# Patient Record
Sex: FEMALE | Race: BLACK OR AFRICAN AMERICAN | NOT HISPANIC OR LATINO | URBAN - METROPOLITAN AREA
[De-identification: names, ages, dates, MRNs, and addresses within clinical notes are randomized per-mention and may not be internally consistent; named-entity substitution may affect disease eponyms.]

---

## 2017-12-04 PROBLEM — Z00.00 ENCOUNTER FOR PREVENTIVE HEALTH EXAMINATION: Status: ACTIVE | Noted: 2017-12-04

## 2022-01-01 ENCOUNTER — INPATIENT (INPATIENT)
Facility: HOSPITAL | Age: 77
LOS: 5 days | DRG: 64 | End: 2022-10-08
Attending: NEUROLOGICAL SURGERY | Admitting: NEUROLOGICAL SURGERY
Payer: MEDICARE

## 2022-01-01 VITALS
RESPIRATION RATE: 16 BRPM | SYSTOLIC BLOOD PRESSURE: 240 MMHG | DIASTOLIC BLOOD PRESSURE: 122 MMHG | TEMPERATURE: 98 F | HEIGHT: 64 IN | HEART RATE: 63 BPM | OXYGEN SATURATION: 96 %

## 2022-01-01 DIAGNOSIS — I61.9 NONTRAUMATIC INTRACEREBRAL HEMORRHAGE, UNSPECIFIED: ICD-10-CM

## 2022-01-01 LAB
A1C WITH ESTIMATED AVERAGE GLUCOSE RESULT: 5.5 % — SIGNIFICANT CHANGE UP (ref 4–5.6)
ABO RH CONFIRMATION: SIGNIFICANT CHANGE UP
ALBUMIN SERPL ELPH-MCNC: 2.2 G/DL — LOW (ref 3.3–5.2)
ALBUMIN SERPL ELPH-MCNC: 3.4 G/DL — SIGNIFICANT CHANGE UP (ref 3.3–5.2)
ALBUMIN SERPL ELPH-MCNC: 3.6 G/DL — SIGNIFICANT CHANGE UP (ref 3.3–5.2)
ALP SERPL-CCNC: 65 U/L — SIGNIFICANT CHANGE UP (ref 40–120)
ALP SERPL-CCNC: 90 U/L — SIGNIFICANT CHANGE UP (ref 40–120)
ALP SERPL-CCNC: 95 U/L — SIGNIFICANT CHANGE UP (ref 40–120)
ALT FLD-CCNC: 72 U/L — HIGH
ALT FLD-CCNC: 9 U/L — SIGNIFICANT CHANGE UP
ALT FLD-CCNC: 9 U/L — SIGNIFICANT CHANGE UP
ANION GAP SERPL CALC-SCNC: 10 MMOL/L — SIGNIFICANT CHANGE UP (ref 5–17)
ANION GAP SERPL CALC-SCNC: 10 MMOL/L — SIGNIFICANT CHANGE UP (ref 5–17)
ANION GAP SERPL CALC-SCNC: 12 MMOL/L — SIGNIFICANT CHANGE UP (ref 5–17)
ANION GAP SERPL CALC-SCNC: 13 MMOL/L — SIGNIFICANT CHANGE UP (ref 5–17)
ANION GAP SERPL CALC-SCNC: 15 MMOL/L — SIGNIFICANT CHANGE UP (ref 5–17)
ANION GAP SERPL CALC-SCNC: 18 MMOL/L — HIGH (ref 5–17)
ANISOCYTOSIS BLD QL: SLIGHT — SIGNIFICANT CHANGE UP
APPEARANCE UR: CLEAR — SIGNIFICANT CHANGE UP
APTT BLD: 30.9 SEC — SIGNIFICANT CHANGE UP (ref 27.5–35.5)
AST SERPL-CCNC: 18 U/L — SIGNIFICANT CHANGE UP
AST SERPL-CCNC: 19 U/L — SIGNIFICANT CHANGE UP
AST SERPL-CCNC: 73 U/L — HIGH
BACTERIA # UR AUTO: ABNORMAL
BASE EXCESS BLDA CALC-SCNC: -0.7 MMOL/L — SIGNIFICANT CHANGE UP (ref -2–3)
BASE EXCESS BLDA CALC-SCNC: -2.3 MMOL/L — LOW (ref -2–3)
BASOPHILS # BLD AUTO: 0.06 K/UL — SIGNIFICANT CHANGE UP (ref 0–0.2)
BASOPHILS # BLD AUTO: 0.2 K/UL — SIGNIFICANT CHANGE UP (ref 0–0.2)
BASOPHILS NFR BLD AUTO: 0.5 % — SIGNIFICANT CHANGE UP (ref 0–2)
BASOPHILS NFR BLD AUTO: 0.9 % — SIGNIFICANT CHANGE UP (ref 0–2)
BILIRUB DIRECT SERPL-MCNC: 0.1 MG/DL — SIGNIFICANT CHANGE UP (ref 0–0.3)
BILIRUB INDIRECT FLD-MCNC: 0.2 MG/DL — SIGNIFICANT CHANGE UP (ref 0.2–1)
BILIRUB SERPL-MCNC: 0.3 MG/DL — LOW (ref 0.4–2)
BILIRUB SERPL-MCNC: 0.3 MG/DL — LOW (ref 0.4–2)
BILIRUB SERPL-MCNC: 0.8 MG/DL — SIGNIFICANT CHANGE UP (ref 0.4–2)
BILIRUB UR-MCNC: NEGATIVE — SIGNIFICANT CHANGE UP
BLOOD GAS COMMENTS ARTERIAL: SIGNIFICANT CHANGE UP
BLOOD GAS COMMENTS ARTERIAL: SIGNIFICANT CHANGE UP
BUN SERPL-MCNC: 11.7 MG/DL — SIGNIFICANT CHANGE UP (ref 8–20)
BUN SERPL-MCNC: 18 MG/DL — SIGNIFICANT CHANGE UP (ref 8–20)
BUN SERPL-MCNC: 28.9 MG/DL — HIGH (ref 8–20)
BUN SERPL-MCNC: 28.9 MG/DL — HIGH (ref 8–20)
BUN SERPL-MCNC: 30.3 MG/DL — HIGH (ref 8–20)
BUN SERPL-MCNC: 32.4 MG/DL — HIGH (ref 8–20)
BURR CELLS BLD QL SMEAR: PRESENT — SIGNIFICANT CHANGE UP
CALCIUM SERPL-MCNC: 10 MG/DL — SIGNIFICANT CHANGE UP (ref 8.4–10.5)
CALCIUM SERPL-MCNC: 9.3 MG/DL — SIGNIFICANT CHANGE UP (ref 8.4–10.5)
CALCIUM SERPL-MCNC: 9.4 MG/DL — SIGNIFICANT CHANGE UP (ref 8.4–10.5)
CALCIUM SERPL-MCNC: 9.5 MG/DL — SIGNIFICANT CHANGE UP (ref 8.4–10.5)
CALCIUM SERPL-MCNC: 9.8 MG/DL — SIGNIFICANT CHANGE UP (ref 8.4–10.5)
CALCIUM SERPL-MCNC: 9.9 MG/DL — SIGNIFICANT CHANGE UP (ref 8.4–10.5)
CHLORIDE SERPL-SCNC: 106 MMOL/L — SIGNIFICANT CHANGE UP (ref 98–107)
CHLORIDE SERPL-SCNC: 108 MMOL/L — HIGH (ref 98–107)
CHLORIDE SERPL-SCNC: 115 MMOL/L — HIGH (ref 98–107)
CHLORIDE SERPL-SCNC: 117 MMOL/L — HIGH (ref 98–107)
CHLORIDE SERPL-SCNC: 118 MMOL/L — HIGH (ref 98–107)
CHLORIDE SERPL-SCNC: 119 MMOL/L — HIGH (ref 98–107)
CHOLEST SERPL-MCNC: 135 MG/DL — SIGNIFICANT CHANGE UP
CO2 SERPL-SCNC: 19 MMOL/L — LOW (ref 22–29)
CO2 SERPL-SCNC: 19 MMOL/L — LOW (ref 22–29)
CO2 SERPL-SCNC: 20 MMOL/L — LOW (ref 22–29)
CO2 SERPL-SCNC: 22 MMOL/L — SIGNIFICANT CHANGE UP (ref 22–29)
CO2 SERPL-SCNC: 22 MMOL/L — SIGNIFICANT CHANGE UP (ref 22–29)
CO2 SERPL-SCNC: 24 MMOL/L — SIGNIFICANT CHANGE UP (ref 22–29)
COLOR SPEC: YELLOW — SIGNIFICANT CHANGE UP
CREAT SERPL-MCNC: 0.87 MG/DL — SIGNIFICANT CHANGE UP (ref 0.5–1.3)
CREAT SERPL-MCNC: 0.96 MG/DL — SIGNIFICANT CHANGE UP (ref 0.5–1.3)
CREAT SERPL-MCNC: 1.01 MG/DL — SIGNIFICANT CHANGE UP (ref 0.5–1.3)
CREAT SERPL-MCNC: 1.01 MG/DL — SIGNIFICANT CHANGE UP (ref 0.5–1.3)
CREAT SERPL-MCNC: 1.27 MG/DL — SIGNIFICANT CHANGE UP (ref 0.5–1.3)
CREAT SERPL-MCNC: 1.35 MG/DL — HIGH (ref 0.5–1.3)
CULTURE RESULTS: NO GROWTH — SIGNIFICANT CHANGE UP
CULTURE RESULTS: SIGNIFICANT CHANGE UP
DIFF PNL FLD: ABNORMAL
EGFR: 41 ML/MIN/1.73M2 — LOW
EGFR: 44 ML/MIN/1.73M2 — LOW
EGFR: 58 ML/MIN/1.73M2 — LOW
EGFR: 58 ML/MIN/1.73M2 — LOW
EGFR: 62 ML/MIN/1.73M2 — SIGNIFICANT CHANGE UP
EGFR: 69 ML/MIN/1.73M2 — SIGNIFICANT CHANGE UP
EOSINOPHIL # BLD AUTO: 0 K/UL — SIGNIFICANT CHANGE UP (ref 0–0.5)
EOSINOPHIL # BLD AUTO: 0.28 K/UL — SIGNIFICANT CHANGE UP (ref 0–0.5)
EOSINOPHIL NFR BLD AUTO: 0 % — SIGNIFICANT CHANGE UP (ref 0–6)
EOSINOPHIL NFR BLD AUTO: 2.1 % — SIGNIFICANT CHANGE UP (ref 0–6)
EPI CELLS # UR: SIGNIFICANT CHANGE UP
ESTIMATED AVERAGE GLUCOSE: 111 MG/DL — SIGNIFICANT CHANGE UP (ref 68–114)
FLUAV AG NPH QL: SIGNIFICANT CHANGE UP
FLUBV AG NPH QL: SIGNIFICANT CHANGE UP
GAS PNL BLDA: SIGNIFICANT CHANGE UP
GIANT PLATELETS BLD QL SMEAR: PRESENT — SIGNIFICANT CHANGE UP
GLUCOSE BLDC GLUCOMTR-MCNC: 157 MG/DL — HIGH (ref 70–99)
GLUCOSE SERPL-MCNC: 144 MG/DL — HIGH (ref 70–99)
GLUCOSE SERPL-MCNC: 144 MG/DL — HIGH (ref 70–99)
GLUCOSE SERPL-MCNC: 156 MG/DL — HIGH (ref 70–99)
GLUCOSE SERPL-MCNC: 161 MG/DL — HIGH (ref 70–99)
GLUCOSE SERPL-MCNC: 179 MG/DL — HIGH (ref 70–99)
GLUCOSE SERPL-MCNC: 194 MG/DL — HIGH (ref 70–99)
GLUCOSE UR QL: NEGATIVE MG/DL — SIGNIFICANT CHANGE UP
GRAM STN FLD: SIGNIFICANT CHANGE UP
HCO3 BLDA-SCNC: 21 MMOL/L — SIGNIFICANT CHANGE UP (ref 21–28)
HCO3 BLDA-SCNC: 22 MMOL/L — SIGNIFICANT CHANGE UP (ref 21–28)
HCT VFR BLD CALC: 30.7 % — LOW (ref 34.5–45)
HCT VFR BLD CALC: 32.9 % — LOW (ref 34.5–45)
HCT VFR BLD CALC: 33.1 % — LOW (ref 34.5–45)
HCT VFR BLD CALC: 35.9 % — SIGNIFICANT CHANGE UP (ref 34.5–45)
HCT VFR BLD CALC: 38.3 % — SIGNIFICANT CHANGE UP (ref 34.5–45)
HCT VFR BLD CALC: 41.1 % — SIGNIFICANT CHANGE UP (ref 34.5–45)
HCV AB S/CO SERPL IA: 0.07 S/CO — SIGNIFICANT CHANGE UP (ref 0–0.99)
HCV AB SERPL-IMP: SIGNIFICANT CHANGE UP
HDLC SERPL-MCNC: 51 MG/DL — SIGNIFICANT CHANGE UP
HGB BLD-MCNC: 10.4 G/DL — LOW (ref 11.5–15.5)
HGB BLD-MCNC: 10.5 G/DL — LOW (ref 11.5–15.5)
HGB BLD-MCNC: 11.6 G/DL — SIGNIFICANT CHANGE UP (ref 11.5–15.5)
HGB BLD-MCNC: 12.5 G/DL — SIGNIFICANT CHANGE UP (ref 11.5–15.5)
HGB BLD-MCNC: 13.8 G/DL — SIGNIFICANT CHANGE UP (ref 11.5–15.5)
HGB BLD-MCNC: 9.7 G/DL — LOW (ref 11.5–15.5)
HOROWITZ INDEX BLDA+IHG-RTO: 30 — SIGNIFICANT CHANGE UP
HOROWITZ INDEX BLDA+IHG-RTO: SIGNIFICANT CHANGE UP
IMM GRANULOCYTES NFR BLD AUTO: 0.7 % — SIGNIFICANT CHANGE UP (ref 0–0.9)
INR BLD: 1.09 RATIO — SIGNIFICANT CHANGE UP (ref 0.88–1.16)
KETONES UR-MCNC: NEGATIVE — SIGNIFICANT CHANGE UP
LEUKOCYTE ESTERASE UR-ACNC: NEGATIVE — SIGNIFICANT CHANGE UP
LIPID PNL WITH DIRECT LDL SERPL: 70 MG/DL — SIGNIFICANT CHANGE UP
LYMPHOCYTES # BLD AUTO: 1.19 K/UL — SIGNIFICANT CHANGE UP (ref 1–3.3)
LYMPHOCYTES # BLD AUTO: 19.6 % — SIGNIFICANT CHANGE UP (ref 13–44)
LYMPHOCYTES # BLD AUTO: 2.58 K/UL — SIGNIFICANT CHANGE UP (ref 1–3.3)
LYMPHOCYTES # BLD AUTO: 5.3 % — LOW (ref 13–44)
MAGNESIUM SERPL-MCNC: 2 MG/DL — SIGNIFICANT CHANGE UP (ref 1.6–2.6)
MAGNESIUM SERPL-MCNC: 2.1 MG/DL — SIGNIFICANT CHANGE UP (ref 1.6–2.6)
MAGNESIUM SERPL-MCNC: 2.5 MG/DL — SIGNIFICANT CHANGE UP (ref 1.6–2.6)
MAGNESIUM SERPL-MCNC: 2.7 MG/DL — HIGH (ref 1.6–2.6)
MAGNESIUM SERPL-MCNC: 2.7 MG/DL — HIGH (ref 1.6–2.6)
MANUAL SMEAR VERIFICATION: SIGNIFICANT CHANGE UP
MCHC RBC-ENTMCNC: 29.5 PG — SIGNIFICANT CHANGE UP (ref 27–34)
MCHC RBC-ENTMCNC: 29.7 PG — SIGNIFICANT CHANGE UP (ref 27–34)
MCHC RBC-ENTMCNC: 29.7 PG — SIGNIFICANT CHANGE UP (ref 27–34)
MCHC RBC-ENTMCNC: 30 PG — SIGNIFICANT CHANGE UP (ref 27–34)
MCHC RBC-ENTMCNC: 30.2 PG — SIGNIFICANT CHANGE UP (ref 27–34)
MCHC RBC-ENTMCNC: 30.3 PG — SIGNIFICANT CHANGE UP (ref 27–34)
MCHC RBC-ENTMCNC: 31.6 GM/DL — LOW (ref 32–36)
MCHC RBC-ENTMCNC: 31.6 GM/DL — LOW (ref 32–36)
MCHC RBC-ENTMCNC: 31.7 GM/DL — LOW (ref 32–36)
MCHC RBC-ENTMCNC: 32.3 GM/DL — SIGNIFICANT CHANGE UP (ref 32–36)
MCHC RBC-ENTMCNC: 32.6 GM/DL — SIGNIFICANT CHANGE UP (ref 32–36)
MCHC RBC-ENTMCNC: 33.6 GM/DL — SIGNIFICANT CHANGE UP (ref 32–36)
MCV RBC AUTO: 90.1 FL — SIGNIFICANT CHANGE UP (ref 80–100)
MCV RBC AUTO: 91.8 FL — SIGNIFICANT CHANGE UP (ref 80–100)
MCV RBC AUTO: 92.1 FL — SIGNIFICANT CHANGE UP (ref 80–100)
MCV RBC AUTO: 93.2 FL — SIGNIFICANT CHANGE UP (ref 80–100)
MCV RBC AUTO: 93.9 FL — SIGNIFICANT CHANGE UP (ref 80–100)
MCV RBC AUTO: 95.1 FL — SIGNIFICANT CHANGE UP (ref 80–100)
MONOCYTES # BLD AUTO: 0.6 K/UL — SIGNIFICANT CHANGE UP (ref 0–0.9)
MONOCYTES # BLD AUTO: 0.77 K/UL — SIGNIFICANT CHANGE UP (ref 0–0.9)
MONOCYTES NFR BLD AUTO: 2.7 % — SIGNIFICANT CHANGE UP (ref 2–14)
MONOCYTES NFR BLD AUTO: 5.9 % — SIGNIFICANT CHANGE UP (ref 2–14)
MRSA PCR RESULT.: SIGNIFICANT CHANGE UP
NEUTROPHILS # BLD AUTO: 20.4 K/UL — HIGH (ref 1.8–7.4)
NEUTROPHILS # BLD AUTO: 9.38 K/UL — HIGH (ref 1.8–7.4)
NEUTROPHILS NFR BLD AUTO: 70.8 % — SIGNIFICANT CHANGE UP (ref 43–77)
NEUTROPHILS NFR BLD AUTO: 71.2 % — SIGNIFICANT CHANGE UP (ref 43–77)
NEUTS BAND # BLD: 20.3 % — HIGH (ref 0–8)
NITRITE UR-MCNC: NEGATIVE — SIGNIFICANT CHANGE UP
NON HDL CHOLESTEROL: 84 MG/DL — SIGNIFICANT CHANGE UP
OSMOLALITY UR: 389 MOSM/KG — SIGNIFICANT CHANGE UP (ref 300–1000)
OSMOLALITY UR: 421 MOSM/KG — SIGNIFICANT CHANGE UP (ref 300–1000)
OVALOCYTES BLD QL SMEAR: SLIGHT — SIGNIFICANT CHANGE UP
PCO2 BLDA: 24 MMHG — LOW (ref 32–45)
PCO2 BLDA: 26 MMHG — LOW (ref 32–45)
PH BLDA: 7.51 — HIGH (ref 7.35–7.45)
PH BLDA: 7.56 — HIGH (ref 7.35–7.45)
PH UR: 7 — SIGNIFICANT CHANGE UP (ref 5–8)
PHOSPHATE SERPL-MCNC: 2.3 MG/DL — LOW (ref 2.4–4.7)
PHOSPHATE SERPL-MCNC: 2.3 MG/DL — LOW (ref 2.4–4.7)
PHOSPHATE SERPL-MCNC: 2.5 MG/DL — SIGNIFICANT CHANGE UP (ref 2.4–4.7)
PHOSPHATE SERPL-MCNC: 2.9 MG/DL — SIGNIFICANT CHANGE UP (ref 2.4–4.7)
PHOSPHATE SERPL-MCNC: 3 MG/DL — SIGNIFICANT CHANGE UP (ref 2.4–4.7)
PLAT MORPH BLD: NORMAL — SIGNIFICANT CHANGE UP
PLATELET # BLD AUTO: 215 K/UL — SIGNIFICANT CHANGE UP (ref 150–400)
PLATELET # BLD AUTO: 231 K/UL — SIGNIFICANT CHANGE UP (ref 150–400)
PLATELET # BLD AUTO: 240 K/UL — SIGNIFICANT CHANGE UP (ref 150–400)
PLATELET # BLD AUTO: 289 K/UL — SIGNIFICANT CHANGE UP (ref 150–400)
PLATELET # BLD AUTO: 342 K/UL — SIGNIFICANT CHANGE UP (ref 150–400)
PLATELET # BLD AUTO: 365 K/UL — SIGNIFICANT CHANGE UP (ref 150–400)
PO2 BLDA: 109 MMHG — HIGH (ref 83–108)
PO2 BLDA: 86 MMHG — SIGNIFICANT CHANGE UP (ref 83–108)
POIKILOCYTOSIS BLD QL AUTO: SLIGHT — SIGNIFICANT CHANGE UP
POLYCHROMASIA BLD QL SMEAR: SLIGHT — SIGNIFICANT CHANGE UP
POTASSIUM SERPL-MCNC: 3.3 MMOL/L — LOW (ref 3.5–5.3)
POTASSIUM SERPL-MCNC: 3.5 MMOL/L — SIGNIFICANT CHANGE UP (ref 3.5–5.3)
POTASSIUM SERPL-MCNC: 3.6 MMOL/L — SIGNIFICANT CHANGE UP (ref 3.5–5.3)
POTASSIUM SERPL-MCNC: 4.1 MMOL/L — SIGNIFICANT CHANGE UP (ref 3.5–5.3)
POTASSIUM SERPL-MCNC: 4.5 MMOL/L — SIGNIFICANT CHANGE UP (ref 3.5–5.3)
POTASSIUM SERPL-MCNC: 4.6 MMOL/L — SIGNIFICANT CHANGE UP (ref 3.5–5.3)
POTASSIUM SERPL-SCNC: 3.3 MMOL/L — LOW (ref 3.5–5.3)
POTASSIUM SERPL-SCNC: 3.5 MMOL/L — SIGNIFICANT CHANGE UP (ref 3.5–5.3)
POTASSIUM SERPL-SCNC: 3.6 MMOL/L — SIGNIFICANT CHANGE UP (ref 3.5–5.3)
POTASSIUM SERPL-SCNC: 4.1 MMOL/L — SIGNIFICANT CHANGE UP (ref 3.5–5.3)
POTASSIUM SERPL-SCNC: 4.5 MMOL/L — SIGNIFICANT CHANGE UP (ref 3.5–5.3)
POTASSIUM SERPL-SCNC: 4.6 MMOL/L — SIGNIFICANT CHANGE UP (ref 3.5–5.3)
PROT SERPL-MCNC: 5.9 G/DL — LOW (ref 6.6–8.7)
PROT SERPL-MCNC: 7.1 G/DL — SIGNIFICANT CHANGE UP (ref 6.6–8.7)
PROT SERPL-MCNC: 7.4 G/DL — SIGNIFICANT CHANGE UP (ref 6.6–8.7)
PROT UR-MCNC: NEGATIVE — SIGNIFICANT CHANGE UP
PROTHROM AB SERPL-ACNC: 12.6 SEC — SIGNIFICANT CHANGE UP (ref 10.5–13.4)
RBC # BLD: 3.27 M/UL — LOW (ref 3.8–5.2)
RBC # BLD: 3.48 M/UL — LOW (ref 3.8–5.2)
RBC # BLD: 3.53 M/UL — LOW (ref 3.8–5.2)
RBC # BLD: 3.9 M/UL — SIGNIFICANT CHANGE UP (ref 3.8–5.2)
RBC # BLD: 4.17 M/UL — SIGNIFICANT CHANGE UP (ref 3.8–5.2)
RBC # BLD: 4.56 M/UL — SIGNIFICANT CHANGE UP (ref 3.8–5.2)
RBC # FLD: 13.4 % — SIGNIFICANT CHANGE UP (ref 10.3–14.5)
RBC # FLD: 13.7 % — SIGNIFICANT CHANGE UP (ref 10.3–14.5)
RBC # FLD: 14.4 % — SIGNIFICANT CHANGE UP (ref 10.3–14.5)
RBC # FLD: 14.6 % — HIGH (ref 10.3–14.5)
RBC # FLD: 14.7 % — HIGH (ref 10.3–14.5)
RBC # FLD: 14.8 % — HIGH (ref 10.3–14.5)
RBC BLD AUTO: SIGNIFICANT CHANGE UP
RBC CASTS # UR COMP ASSIST: SIGNIFICANT CHANGE UP /HPF (ref 0–4)
RSV RNA NPH QL NAA+NON-PROBE: SIGNIFICANT CHANGE UP
S AUREUS DNA NOSE QL NAA+PROBE: SIGNIFICANT CHANGE UP
SAO2 % BLDA: 100 % — HIGH (ref 94–98)
SAO2 % BLDA: 99.5 % — HIGH (ref 94–98)
SARS-COV-2 RNA SPEC QL NAA+PROBE: DETECTED
SCHISTOCYTES BLD QL AUTO: SLIGHT — SIGNIFICANT CHANGE UP
SODIUM SERPL-SCNC: 142 MMOL/L — SIGNIFICANT CHANGE UP (ref 135–145)
SODIUM SERPL-SCNC: 145 MMOL/L — SIGNIFICANT CHANGE UP (ref 135–145)
SODIUM SERPL-SCNC: 149 MMOL/L — HIGH (ref 135–145)
SODIUM SERPL-SCNC: 149 MMOL/L — HIGH (ref 135–145)
SODIUM SERPL-SCNC: 150 MMOL/L — HIGH (ref 135–145)
SODIUM SERPL-SCNC: 151 MMOL/L — HIGH (ref 135–145)
SODIUM UR-SCNC: 157 MMOL/L — SIGNIFICANT CHANGE UP
SP GR SPEC: 1 — LOW (ref 1.01–1.02)
SPECIMEN SOURCE: SIGNIFICANT CHANGE UP
T3 SERPL-MCNC: 76 NG/DL — LOW (ref 80–200)
T4 AB SER-ACNC: 8.4 UG/DL — SIGNIFICANT CHANGE UP (ref 4.5–12)
TRIGL SERPL-MCNC: 68 MG/DL — SIGNIFICANT CHANGE UP
TROPONIN T SERPL-MCNC: <0.01 NG/ML — SIGNIFICANT CHANGE UP (ref 0–0.06)
TSH SERPL-MCNC: 0.18 UIU/ML — LOW (ref 0.27–4.2)
TSH SERPL-MCNC: 0.2 UIU/ML — LOW (ref 0.27–4.2)
UFH PPP CHRO-ACNC: 0.03 U/ML — LOW (ref 0.3–0.7)
UROBILINOGEN FLD QL: NEGATIVE MG/DL — SIGNIFICANT CHANGE UP
WBC # BLD: 11.8 K/UL — HIGH (ref 3.8–10.5)
WBC # BLD: 13.16 K/UL — HIGH (ref 3.8–10.5)
WBC # BLD: 15.55 K/UL — HIGH (ref 3.8–10.5)
WBC # BLD: 18.8 K/UL — HIGH (ref 3.8–10.5)
WBC # BLD: 22.25 K/UL — HIGH (ref 3.8–10.5)
WBC # BLD: 22.39 K/UL — HIGH (ref 3.8–10.5)
WBC # FLD AUTO: 11.8 K/UL — HIGH (ref 3.8–10.5)
WBC # FLD AUTO: 13.16 K/UL — HIGH (ref 3.8–10.5)
WBC # FLD AUTO: 15.55 K/UL — HIGH (ref 3.8–10.5)
WBC # FLD AUTO: 18.8 K/UL — HIGH (ref 3.8–10.5)
WBC # FLD AUTO: 22.25 K/UL — HIGH (ref 3.8–10.5)
WBC # FLD AUTO: 22.39 K/UL — HIGH (ref 3.8–10.5)
WBC UR QL: SIGNIFICANT CHANGE UP /HPF (ref 0–5)

## 2022-01-01 PROCEDURE — 31500 INSERT EMERGENCY AIRWAY: CPT

## 2022-01-01 PROCEDURE — 99233 SBSQ HOSP IP/OBS HIGH 50: CPT

## 2022-01-01 PROCEDURE — 99291 CRITICAL CARE FIRST HOUR: CPT

## 2022-01-01 PROCEDURE — 99232 SBSQ HOSP IP/OBS MODERATE 35: CPT

## 2022-01-01 PROCEDURE — 95819 EEG AWAKE AND ASLEEP: CPT | Mod: 26

## 2022-01-01 PROCEDURE — 99222 1ST HOSP IP/OBS MODERATE 55: CPT

## 2022-01-01 PROCEDURE — 93970 EXTREMITY STUDY: CPT | Mod: 26

## 2022-01-01 PROCEDURE — 70450 CT HEAD/BRAIN W/O DYE: CPT | Mod: 26,MA

## 2022-01-01 PROCEDURE — 71045 X-RAY EXAM CHEST 1 VIEW: CPT | Mod: 26

## 2022-01-01 PROCEDURE — 99498 ADVNCD CARE PLAN ADDL 30 MIN: CPT | Mod: 25

## 2022-01-01 PROCEDURE — 86077 PHYS BLOOD BANK SERV XMATCH: CPT

## 2022-01-01 PROCEDURE — 70450 CT HEAD/BRAIN W/O DYE: CPT | Mod: 26

## 2022-01-01 PROCEDURE — 99497 ADVNCD CARE PLAN 30 MIN: CPT | Mod: 25

## 2022-01-01 PROCEDURE — 36569 INSJ PICC 5 YR+ W/O IMAGING: CPT

## 2022-01-01 PROCEDURE — 93010 ELECTROCARDIOGRAM REPORT: CPT

## 2022-01-01 PROCEDURE — 93306 TTE W/DOPPLER COMPLETE: CPT | Mod: 26

## 2022-01-01 PROCEDURE — 99053 MED SERV 10PM-8AM 24 HR FAC: CPT

## 2022-01-01 PROCEDURE — 99291 CRITICAL CARE FIRST HOUR: CPT | Mod: 25

## 2022-01-01 RX ORDER — SODIUM CHLORIDE 9 MG/ML
500 INJECTION INTRAMUSCULAR; INTRAVENOUS; SUBCUTANEOUS ONCE
Refills: 0 | Status: COMPLETED | OUTPATIENT
Start: 2022-01-01 | End: 2022-01-01

## 2022-01-01 RX ORDER — DEXMEDETOMIDINE HYDROCHLORIDE IN 0.9% SODIUM CHLORIDE 4 UG/ML
0.2 INJECTION INTRAVENOUS
Qty: 200 | Refills: 0 | Status: DISCONTINUED | OUTPATIENT
Start: 2022-01-01 | End: 2022-01-01

## 2022-01-01 RX ORDER — POTASSIUM CHLORIDE 20 MEQ
40 PACKET (EA) ORAL ONCE
Refills: 0 | Status: COMPLETED | OUTPATIENT
Start: 2022-01-01 | End: 2022-01-01

## 2022-01-01 RX ORDER — LABETALOL HCL 100 MG
10 TABLET ORAL
Refills: 0 | Status: DISCONTINUED | OUTPATIENT
Start: 2022-01-01 | End: 2022-01-01

## 2022-01-01 RX ORDER — DILTIAZEM HCL 120 MG
60 CAPSULE, EXT RELEASE 24 HR ORAL EVERY 8 HOURS
Refills: 0 | Status: DISCONTINUED | OUTPATIENT
Start: 2022-01-01 | End: 2022-01-01

## 2022-01-01 RX ORDER — HYDRALAZINE HCL 50 MG
50 TABLET ORAL EVERY 8 HOURS
Refills: 0 | Status: DISCONTINUED | OUTPATIENT
Start: 2022-01-01 | End: 2022-01-01

## 2022-01-01 RX ORDER — SENNA PLUS 8.6 MG/1
1 TABLET ORAL AT BEDTIME
Refills: 0 | Status: DISCONTINUED | OUTPATIENT
Start: 2022-01-01 | End: 2022-01-01

## 2022-01-01 RX ORDER — CEFEPIME 1 G/1
INJECTION, POWDER, FOR SOLUTION INTRAMUSCULAR; INTRAVENOUS
Refills: 0 | Status: DISCONTINUED | OUTPATIENT
Start: 2022-01-01 | End: 2022-01-01

## 2022-01-01 RX ORDER — CARVEDILOL PHOSPHATE 80 MG/1
1 CAPSULE, EXTENDED RELEASE ORAL
Qty: 0 | Refills: 0 | DISCHARGE

## 2022-01-01 RX ORDER — FENTANYL CITRATE 50 UG/ML
50 INJECTION INTRAVENOUS EVERY 6 HOURS
Refills: 0 | Status: DISCONTINUED | OUTPATIENT
Start: 2022-01-01 | End: 2022-01-01

## 2022-01-01 RX ORDER — LISINOPRIL 2.5 MG/1
10 TABLET ORAL DAILY
Refills: 0 | Status: DISCONTINUED | OUTPATIENT
Start: 2022-01-01 | End: 2022-01-01

## 2022-01-01 RX ORDER — FAMOTIDINE 10 MG/ML
20 INJECTION INTRAVENOUS
Refills: 0 | Status: DISCONTINUED | OUTPATIENT
Start: 2022-01-01 | End: 2022-01-01

## 2022-01-01 RX ORDER — CHLORHEXIDINE GLUCONATE 213 G/1000ML
1 SOLUTION TOPICAL DAILY
Refills: 0 | Status: DISCONTINUED | OUTPATIENT
Start: 2022-01-01 | End: 2022-01-01

## 2022-01-01 RX ORDER — FAMOTIDINE 10 MG/ML
20 INJECTION INTRAVENOUS DAILY
Refills: 0 | Status: DISCONTINUED | OUTPATIENT
Start: 2022-01-01 | End: 2022-01-01

## 2022-01-01 RX ORDER — SODIUM CHLORIDE 9 MG/ML
1000 INJECTION INTRAMUSCULAR; INTRAVENOUS; SUBCUTANEOUS
Refills: 0 | Status: DISCONTINUED | OUTPATIENT
Start: 2022-01-01 | End: 2022-01-01

## 2022-01-01 RX ORDER — ETOMIDATE 2 MG/ML
20 INJECTION INTRAVENOUS ONCE
Refills: 0 | Status: COMPLETED | OUTPATIENT
Start: 2022-01-01 | End: 2022-01-01

## 2022-01-01 RX ORDER — ROSUVASTATIN CALCIUM 5 MG/1
1 TABLET ORAL
Qty: 0 | Refills: 0 | DISCHARGE

## 2022-01-01 RX ORDER — CEFEPIME 1 G/1
2000 INJECTION, POWDER, FOR SOLUTION INTRAMUSCULAR; INTRAVENOUS EVERY 12 HOURS
Refills: 0 | Status: DISCONTINUED | OUTPATIENT
Start: 2022-01-01 | End: 2022-01-01

## 2022-01-01 RX ORDER — SODIUM,POTASSIUM PHOSPHATES 278-250MG
1 POWDER IN PACKET (EA) ORAL ONCE
Refills: 0 | Status: COMPLETED | OUTPATIENT
Start: 2022-01-01 | End: 2022-01-01

## 2022-01-01 RX ORDER — HYDRALAZINE HCL 50 MG
10 TABLET ORAL
Refills: 0 | Status: DISCONTINUED | OUTPATIENT
Start: 2022-01-01 | End: 2022-01-01

## 2022-01-01 RX ORDER — LISINOPRIL 2.5 MG/1
20 TABLET ORAL DAILY
Refills: 0 | Status: DISCONTINUED | OUTPATIENT
Start: 2022-01-01 | End: 2022-01-01

## 2022-01-01 RX ORDER — POTASSIUM CHLORIDE 20 MEQ
10 PACKET (EA) ORAL
Refills: 0 | Status: COMPLETED | OUTPATIENT
Start: 2022-01-01 | End: 2022-01-01

## 2022-01-01 RX ORDER — LEVETIRACETAM 250 MG/1
500 TABLET, FILM COATED ORAL EVERY 12 HOURS
Refills: 0 | Status: DISCONTINUED | OUTPATIENT
Start: 2022-01-01 | End: 2022-01-01

## 2022-01-01 RX ORDER — ACETAMINOPHEN 500 MG
1000 TABLET ORAL ONCE
Refills: 0 | Status: COMPLETED | OUTPATIENT
Start: 2022-01-01 | End: 2022-01-01

## 2022-01-01 RX ORDER — CHLORHEXIDINE GLUCONATE 213 G/1000ML
15 SOLUTION TOPICAL EVERY 12 HOURS
Refills: 0 | Status: DISCONTINUED | OUTPATIENT
Start: 2022-01-01 | End: 2022-01-01

## 2022-01-01 RX ORDER — ROCURONIUM BROMIDE 10 MG/ML
100 VIAL (ML) INTRAVENOUS ONCE
Refills: 0 | Status: COMPLETED | OUTPATIENT
Start: 2022-01-01 | End: 2022-01-01

## 2022-01-01 RX ORDER — HYDRALAZINE HCL 50 MG
2.5 TABLET ORAL ONCE
Refills: 0 | Status: COMPLETED | OUTPATIENT
Start: 2022-01-01 | End: 2022-01-01

## 2022-01-01 RX ORDER — LATANOPROST 0.05 MG/ML
1 SOLUTION/ DROPS OPHTHALMIC; TOPICAL AT BEDTIME
Refills: 0 | Status: DISCONTINUED | OUTPATIENT
Start: 2022-01-01 | End: 2022-01-01

## 2022-01-01 RX ORDER — MANNITOL
70 POWDER (GRAM) MISCELLANEOUS ONCE
Refills: 0 | Status: COMPLETED | OUTPATIENT
Start: 2022-01-01 | End: 2022-01-01

## 2022-01-01 RX ORDER — CEFEPIME 1 G/1
2000 INJECTION, POWDER, FOR SOLUTION INTRAMUSCULAR; INTRAVENOUS ONCE
Refills: 0 | Status: COMPLETED | OUTPATIENT
Start: 2022-01-01 | End: 2022-01-01

## 2022-01-01 RX ORDER — POTASSIUM PHOSPHATE, MONOBASIC POTASSIUM PHOSPHATE, DIBASIC 236; 224 MG/ML; MG/ML
15 INJECTION, SOLUTION INTRAVENOUS ONCE
Refills: 0 | Status: COMPLETED | OUTPATIENT
Start: 2022-01-01 | End: 2022-01-01

## 2022-01-01 RX ORDER — MANNITOL
70 POWDER (GRAM) MISCELLANEOUS ONCE
Refills: 0 | Status: DISCONTINUED | OUTPATIENT
Start: 2022-01-01 | End: 2022-01-01

## 2022-01-01 RX ORDER — LEVETIRACETAM 250 MG/1
1000 TABLET, FILM COATED ORAL ONCE
Refills: 0 | Status: COMPLETED | OUTPATIENT
Start: 2022-01-01 | End: 2022-01-01

## 2022-01-01 RX ORDER — POLYETHYLENE GLYCOL 3350 17 G/17G
17 POWDER, FOR SOLUTION ORAL DAILY
Refills: 0 | Status: DISCONTINUED | OUTPATIENT
Start: 2022-01-01 | End: 2022-01-01

## 2022-01-01 RX ORDER — HYDRALAZINE HCL 50 MG
100 TABLET ORAL EVERY 8 HOURS
Refills: 0 | Status: DISCONTINUED | OUTPATIENT
Start: 2022-01-01 | End: 2022-01-01

## 2022-01-01 RX ORDER — FUROSEMIDE 40 MG
1 TABLET ORAL
Qty: 0 | Refills: 0 | DISCHARGE

## 2022-01-01 RX ORDER — LEVETIRACETAM 250 MG/1
500 TABLET, FILM COATED ORAL
Refills: 0 | Status: DISCONTINUED | OUTPATIENT
Start: 2022-01-01 | End: 2022-01-01

## 2022-01-01 RX ORDER — FENTANYL CITRATE 50 UG/ML
25 INJECTION INTRAVENOUS EVERY 6 HOURS
Refills: 0 | Status: DISCONTINUED | OUTPATIENT
Start: 2022-01-01 | End: 2022-01-01

## 2022-01-01 RX ORDER — ACETAMINOPHEN 500 MG
650 TABLET ORAL EVERY 6 HOURS
Refills: 0 | Status: DISCONTINUED | OUTPATIENT
Start: 2022-01-01 | End: 2022-01-01

## 2022-01-01 RX ORDER — ALBUTEROL 90 UG/1
2 AEROSOL, METERED ORAL EVERY 6 HOURS
Refills: 0 | Status: DISCONTINUED | OUTPATIENT
Start: 2022-01-01 | End: 2022-01-01

## 2022-01-01 RX ORDER — NICARDIPINE HYDROCHLORIDE 30 MG/1
5 CAPSULE, EXTENDED RELEASE ORAL
Qty: 40 | Refills: 0 | Status: DISCONTINUED | OUTPATIENT
Start: 2022-01-01 | End: 2022-01-01

## 2022-01-01 RX ORDER — FUROSEMIDE 40 MG
20 TABLET ORAL ONCE
Refills: 0 | Status: COMPLETED | OUTPATIENT
Start: 2022-01-01 | End: 2022-01-01

## 2022-01-01 RX ADMIN — Medication 2.5 MILLIGRAM(S): at 08:19

## 2022-01-01 RX ADMIN — Medication 60 MILLIGRAM(S): at 05:27

## 2022-01-01 RX ADMIN — Medication 50 MILLIGRAM(S): at 09:43

## 2022-01-01 RX ADMIN — DEXMEDETOMIDINE HYDROCHLORIDE IN 0.9% SODIUM CHLORIDE 3.32 MICROGRAM(S)/KG/HR: 4 INJECTION INTRAVENOUS at 17:31

## 2022-01-01 RX ADMIN — Medication 60 MILLIGRAM(S): at 13:04

## 2022-01-01 RX ADMIN — Medication 100 MILLIEQUIVALENT(S): at 15:15

## 2022-01-01 RX ADMIN — ETOMIDATE 20 MILLIGRAM(S): 2 INJECTION INTRAVENOUS at 07:26

## 2022-01-01 RX ADMIN — CEFEPIME 2000 MILLIGRAM(S): 1 INJECTION, POWDER, FOR SOLUTION INTRAMUSCULAR; INTRAVENOUS at 05:28

## 2022-01-01 RX ADMIN — FENTANYL CITRATE 50 MICROGRAM(S): 50 INJECTION INTRAVENOUS at 17:46

## 2022-01-01 RX ADMIN — LEVETIRACETAM 400 MILLIGRAM(S): 250 TABLET, FILM COATED ORAL at 17:30

## 2022-01-01 RX ADMIN — LEVETIRACETAM 500 MILLIGRAM(S): 250 TABLET, FILM COATED ORAL at 05:27

## 2022-01-01 RX ADMIN — LATANOPROST 1 DROP(S): 0.05 SOLUTION/ DROPS OPHTHALMIC; TOPICAL at 22:12

## 2022-01-01 RX ADMIN — Medication 10 MILLIGRAM(S): at 20:04

## 2022-01-01 RX ADMIN — CEFEPIME 2000 MILLIGRAM(S): 1 INJECTION, POWDER, FOR SOLUTION INTRAMUSCULAR; INTRAVENOUS at 17:33

## 2022-01-01 RX ADMIN — CEFEPIME 2000 MILLIGRAM(S): 1 INJECTION, POWDER, FOR SOLUTION INTRAMUSCULAR; INTRAVENOUS at 17:34

## 2022-01-01 RX ADMIN — Medication 10 MILLIGRAM(S): at 16:10

## 2022-01-01 RX ADMIN — Medication 10 MILLIGRAM(S): at 11:31

## 2022-01-01 RX ADMIN — DEXMEDETOMIDINE HYDROCHLORIDE IN 0.9% SODIUM CHLORIDE 3.32 MICROGRAM(S)/KG/HR: 4 INJECTION INTRAVENOUS at 10:20

## 2022-01-01 RX ADMIN — DEXMEDETOMIDINE HYDROCHLORIDE IN 0.9% SODIUM CHLORIDE 3.32 MICROGRAM(S)/KG/HR: 4 INJECTION INTRAVENOUS at 04:15

## 2022-01-01 RX ADMIN — Medication 10 MILLIGRAM(S): at 14:04

## 2022-01-01 RX ADMIN — NICARDIPINE HYDROCHLORIDE 25 MG/HR: 30 CAPSULE, EXTENDED RELEASE ORAL at 10:54

## 2022-01-01 RX ADMIN — Medication 50 MILLIGRAM(S): at 02:13

## 2022-01-01 RX ADMIN — Medication 10 MILLIGRAM(S): at 08:13

## 2022-01-01 RX ADMIN — Medication 100 MILLIEQUIVALENT(S): at 09:35

## 2022-01-01 RX ADMIN — Medication 10 MILLIGRAM(S): at 17:34

## 2022-01-01 RX ADMIN — LISINOPRIL 10 MILLIGRAM(S): 2.5 TABLET ORAL at 11:10

## 2022-01-01 RX ADMIN — SENNA PLUS 1 TABLET(S): 8.6 TABLET ORAL at 22:49

## 2022-01-01 RX ADMIN — DEXMEDETOMIDINE HYDROCHLORIDE IN 0.9% SODIUM CHLORIDE 3.32 MICROGRAM(S)/KG/HR: 4 INJECTION INTRAVENOUS at 02:16

## 2022-01-01 RX ADMIN — NICARDIPINE HYDROCHLORIDE 25 MG/HR: 30 CAPSULE, EXTENDED RELEASE ORAL at 11:45

## 2022-01-01 RX ADMIN — Medication 50 MILLIGRAM(S): at 18:10

## 2022-01-01 RX ADMIN — POLYETHYLENE GLYCOL 3350 17 GRAM(S): 17 POWDER, FOR SOLUTION ORAL at 11:25

## 2022-01-01 RX ADMIN — Medication 10 MILLIGRAM(S): at 15:18

## 2022-01-01 RX ADMIN — CHLORHEXIDINE GLUCONATE 15 MILLILITER(S): 213 SOLUTION TOPICAL at 05:41

## 2022-01-01 RX ADMIN — Medication 10 MILLIGRAM(S): at 20:09

## 2022-01-01 RX ADMIN — FAMOTIDINE 20 MILLIGRAM(S): 10 INJECTION INTRAVENOUS at 11:24

## 2022-01-01 RX ADMIN — SODIUM CHLORIDE 1000 MILLILITER(S): 9 INJECTION INTRAMUSCULAR; INTRAVENOUS; SUBCUTANEOUS at 13:02

## 2022-01-01 RX ADMIN — CEFEPIME 2000 MILLIGRAM(S): 1 INJECTION, POWDER, FOR SOLUTION INTRAMUSCULAR; INTRAVENOUS at 05:41

## 2022-01-01 RX ADMIN — CHLORHEXIDINE GLUCONATE 1 APPLICATION(S): 213 SOLUTION TOPICAL at 11:25

## 2022-01-01 RX ADMIN — Medication 10 MILLIGRAM(S): at 19:18

## 2022-01-01 RX ADMIN — DEXMEDETOMIDINE HYDROCHLORIDE IN 0.9% SODIUM CHLORIDE 3.32 MICROGRAM(S)/KG/HR: 4 INJECTION INTRAVENOUS at 13:45

## 2022-01-01 RX ADMIN — Medication 50 MILLIGRAM(S): at 01:53

## 2022-01-01 RX ADMIN — NICARDIPINE HYDROCHLORIDE 25 MG/HR: 30 CAPSULE, EXTENDED RELEASE ORAL at 02:31

## 2022-01-01 RX ADMIN — Medication 10 MILLIGRAM(S): at 02:28

## 2022-01-01 RX ADMIN — Medication 10 MILLIGRAM(S): at 11:09

## 2022-01-01 RX ADMIN — CEFEPIME 2000 MILLIGRAM(S): 1 INJECTION, POWDER, FOR SOLUTION INTRAMUSCULAR; INTRAVENOUS at 13:56

## 2022-01-01 RX ADMIN — LATANOPROST 1 DROP(S): 0.05 SOLUTION/ DROPS OPHTHALMIC; TOPICAL at 21:25

## 2022-01-01 RX ADMIN — LATANOPROST 1 DROP(S): 0.05 SOLUTION/ DROPS OPHTHALMIC; TOPICAL at 21:08

## 2022-01-01 RX ADMIN — Medication 100 MILLIEQUIVALENT(S): at 10:44

## 2022-01-01 RX ADMIN — Medication 10 MILLIGRAM(S): at 22:50

## 2022-01-01 RX ADMIN — Medication 10 MILLIGRAM(S): at 11:38

## 2022-01-01 RX ADMIN — Medication 10 MILLIGRAM(S): at 03:45

## 2022-01-01 RX ADMIN — LEVETIRACETAM 400 MILLIGRAM(S): 250 TABLET, FILM COATED ORAL at 18:49

## 2022-01-01 RX ADMIN — Medication 10 MILLIGRAM(S): at 23:41

## 2022-01-01 RX ADMIN — CHLORHEXIDINE GLUCONATE 1 APPLICATION(S): 213 SOLUTION TOPICAL at 11:10

## 2022-01-01 RX ADMIN — Medication 10 MILLIGRAM(S): at 08:36

## 2022-01-01 RX ADMIN — FENTANYL CITRATE 50 MICROGRAM(S): 50 INJECTION INTRAVENOUS at 09:40

## 2022-01-01 RX ADMIN — LEVETIRACETAM 500 MILLIGRAM(S): 250 TABLET, FILM COATED ORAL at 06:27

## 2022-01-01 RX ADMIN — LISINOPRIL 10 MILLIGRAM(S): 2.5 TABLET ORAL at 06:27

## 2022-01-01 RX ADMIN — Medication 10 MILLIGRAM(S): at 17:09

## 2022-01-01 RX ADMIN — CHLORHEXIDINE GLUCONATE 15 MILLILITER(S): 213 SOLUTION TOPICAL at 17:32

## 2022-01-01 RX ADMIN — CEFEPIME 2000 MILLIGRAM(S): 1 INJECTION, POWDER, FOR SOLUTION INTRAMUSCULAR; INTRAVENOUS at 06:28

## 2022-01-01 RX ADMIN — LEVETIRACETAM 400 MILLIGRAM(S): 250 TABLET, FILM COATED ORAL at 05:08

## 2022-01-01 RX ADMIN — Medication 50 MILLIGRAM(S): at 17:32

## 2022-01-01 RX ADMIN — Medication 2 MILLIGRAM(S): at 20:45

## 2022-01-01 RX ADMIN — Medication 100 MILLIEQUIVALENT(S): at 10:54

## 2022-01-01 RX ADMIN — Medication 10 MILLIGRAM(S): at 00:14

## 2022-01-01 RX ADMIN — LEVETIRACETAM 500 MILLIGRAM(S): 250 TABLET, FILM COATED ORAL at 05:42

## 2022-01-01 RX ADMIN — DEXMEDETOMIDINE HYDROCHLORIDE IN 0.9% SODIUM CHLORIDE 3.32 MICROGRAM(S)/KG/HR: 4 INJECTION INTRAVENOUS at 16:09

## 2022-01-01 RX ADMIN — FAMOTIDINE 20 MILLIGRAM(S): 10 INJECTION INTRAVENOUS at 13:56

## 2022-01-01 RX ADMIN — SENNA PLUS 1 TABLET(S): 8.6 TABLET ORAL at 22:12

## 2022-01-01 RX ADMIN — NICARDIPINE HYDROCHLORIDE 25 MG/HR: 30 CAPSULE, EXTENDED RELEASE ORAL at 17:30

## 2022-01-01 RX ADMIN — Medication 10 MILLIGRAM(S): at 05:59

## 2022-01-01 RX ADMIN — Medication 100 MILLIGRAM(S): at 13:04

## 2022-01-01 RX ADMIN — SENNA PLUS 1 TABLET(S): 8.6 TABLET ORAL at 21:09

## 2022-01-01 RX ADMIN — CHLORHEXIDINE GLUCONATE 15 MILLILITER(S): 213 SOLUTION TOPICAL at 18:49

## 2022-01-01 RX ADMIN — CHLORHEXIDINE GLUCONATE 15 MILLILITER(S): 213 SOLUTION TOPICAL at 18:10

## 2022-01-01 RX ADMIN — LEVETIRACETAM 500 MILLIGRAM(S): 250 TABLET, FILM COATED ORAL at 17:35

## 2022-01-01 RX ADMIN — Medication 10 MILLIGRAM(S): at 16:39

## 2022-01-01 RX ADMIN — Medication 100 MILLIEQUIVALENT(S): at 09:12

## 2022-01-01 RX ADMIN — LISINOPRIL 20 MILLIGRAM(S): 2.5 TABLET ORAL at 14:26

## 2022-01-01 RX ADMIN — Medication 10 MILLIGRAM(S): at 15:05

## 2022-01-01 RX ADMIN — CHLORHEXIDINE GLUCONATE 15 MILLILITER(S): 213 SOLUTION TOPICAL at 17:35

## 2022-01-01 RX ADMIN — CHLORHEXIDINE GLUCONATE 15 MILLILITER(S): 213 SOLUTION TOPICAL at 17:30

## 2022-01-01 RX ADMIN — Medication 700 GRAM(S): at 09:17

## 2022-01-01 RX ADMIN — FENTANYL CITRATE 25 MICROGRAM(S): 50 INJECTION INTRAVENOUS at 12:12

## 2022-01-01 RX ADMIN — Medication 60 MILLIGRAM(S): at 22:12

## 2022-01-01 RX ADMIN — FAMOTIDINE 20 MILLIGRAM(S): 10 INJECTION INTRAVENOUS at 17:34

## 2022-01-01 RX ADMIN — Medication 40 MILLIEQUIVALENT(S): at 11:25

## 2022-01-01 RX ADMIN — FAMOTIDINE 20 MILLIGRAM(S): 10 INJECTION INTRAVENOUS at 13:44

## 2022-01-01 RX ADMIN — POLYETHYLENE GLYCOL 3350 17 GRAM(S): 17 POWDER, FOR SOLUTION ORAL at 11:09

## 2022-01-01 RX ADMIN — FENTANYL CITRATE 50 MICROGRAM(S): 50 INJECTION INTRAVENOUS at 09:25

## 2022-01-01 RX ADMIN — LEVETIRACETAM 500 MILLIGRAM(S): 250 TABLET, FILM COATED ORAL at 18:10

## 2022-01-01 RX ADMIN — Medication 20 MILLIGRAM(S): at 12:10

## 2022-01-01 RX ADMIN — Medication 10 MILLIGRAM(S): at 14:21

## 2022-01-01 RX ADMIN — Medication 1000 MILLIGRAM(S): at 20:09

## 2022-01-01 RX ADMIN — CHLORHEXIDINE GLUCONATE 1 APPLICATION(S): 213 SOLUTION TOPICAL at 11:38

## 2022-01-01 RX ADMIN — LEVETIRACETAM 500 MILLIGRAM(S): 250 TABLET, FILM COATED ORAL at 18:33

## 2022-01-01 RX ADMIN — DEXMEDETOMIDINE HYDROCHLORIDE IN 0.9% SODIUM CHLORIDE 3.32 MICROGRAM(S)/KG/HR: 4 INJECTION INTRAVENOUS at 17:30

## 2022-01-01 RX ADMIN — LISINOPRIL 10 MILLIGRAM(S): 2.5 TABLET ORAL at 05:28

## 2022-01-01 RX ADMIN — Medication 60 MILLIGRAM(S): at 13:58

## 2022-01-01 RX ADMIN — POTASSIUM PHOSPHATE, MONOBASIC POTASSIUM PHOSPHATE, DIBASIC 62.5 MILLIMOLE(S): 236; 224 INJECTION, SOLUTION INTRAVENOUS at 10:13

## 2022-01-01 RX ADMIN — Medication 60 MILLIGRAM(S): at 21:24

## 2022-01-01 RX ADMIN — CHLORHEXIDINE GLUCONATE 1 APPLICATION(S): 213 SOLUTION TOPICAL at 13:46

## 2022-01-01 RX ADMIN — Medication 50 MILLIGRAM(S): at 10:09

## 2022-01-01 RX ADMIN — LEVETIRACETAM 500 MILLIGRAM(S): 250 TABLET, FILM COATED ORAL at 17:32

## 2022-01-01 RX ADMIN — Medication 2.5 MILLIGRAM(S): at 08:15

## 2022-01-01 RX ADMIN — Medication 10 MILLIGRAM(S): at 12:10

## 2022-01-01 RX ADMIN — NICARDIPINE HYDROCHLORIDE 25 MG/HR: 30 CAPSULE, EXTENDED RELEASE ORAL at 13:47

## 2022-01-01 RX ADMIN — LEVETIRACETAM 400 MILLIGRAM(S): 250 TABLET, FILM COATED ORAL at 05:11

## 2022-01-01 RX ADMIN — Medication 1 PACKET(S): at 06:27

## 2022-01-01 RX ADMIN — LATANOPROST 1 DROP(S): 0.05 SOLUTION/ DROPS OPHTHALMIC; TOPICAL at 22:49

## 2022-01-01 RX ADMIN — FAMOTIDINE 20 MILLIGRAM(S): 10 INJECTION INTRAVENOUS at 11:09

## 2022-01-01 RX ADMIN — Medication 100 MILLIGRAM(S): at 07:26

## 2022-01-01 RX ADMIN — CHLORHEXIDINE GLUCONATE 15 MILLILITER(S): 213 SOLUTION TOPICAL at 05:13

## 2022-01-01 RX ADMIN — NICARDIPINE HYDROCHLORIDE 25 MG/HR: 30 CAPSULE, EXTENDED RELEASE ORAL at 10:13

## 2022-01-01 RX ADMIN — CEFEPIME 2000 MILLIGRAM(S): 1 INJECTION, POWDER, FOR SOLUTION INTRAMUSCULAR; INTRAVENOUS at 18:10

## 2022-01-01 RX ADMIN — Medication 10 MILLIGRAM(S): at 09:04

## 2022-01-01 RX ADMIN — Medication 10 MILLIGRAM(S): at 05:11

## 2022-01-01 RX ADMIN — Medication 10 MILLIGRAM(S): at 13:04

## 2022-01-01 RX ADMIN — FENTANYL CITRATE 25 MICROGRAM(S): 50 INJECTION INTRAVENOUS at 12:57

## 2022-01-01 RX ADMIN — Medication 100 MILLIEQUIVALENT(S): at 10:38

## 2022-01-01 RX ADMIN — FAMOTIDINE 20 MILLIGRAM(S): 10 INJECTION INTRAVENOUS at 05:27

## 2022-01-01 RX ADMIN — NICARDIPINE HYDROCHLORIDE 25 MG/HR: 30 CAPSULE, EXTENDED RELEASE ORAL at 06:26

## 2022-01-01 RX ADMIN — FENTANYL CITRATE 50 MICROGRAM(S): 50 INJECTION INTRAVENOUS at 17:31

## 2022-01-01 RX ADMIN — LISINOPRIL 10 MILLIGRAM(S): 2.5 TABLET ORAL at 05:43

## 2022-01-01 RX ADMIN — NICARDIPINE HYDROCHLORIDE 25 MG/HR: 30 CAPSULE, EXTENDED RELEASE ORAL at 08:02

## 2022-01-01 RX ADMIN — FAMOTIDINE 20 MILLIGRAM(S): 10 INJECTION INTRAVENOUS at 17:32

## 2022-01-01 RX ADMIN — Medication 10 MILLIGRAM(S): at 12:12

## 2022-01-01 RX ADMIN — CHLORHEXIDINE GLUCONATE 15 MILLILITER(S): 213 SOLUTION TOPICAL at 05:27

## 2022-01-01 RX ADMIN — Medication 60 MILLIGRAM(S): at 05:42

## 2022-01-01 RX ADMIN — Medication 10 MILLIGRAM(S): at 14:26

## 2022-01-01 RX ADMIN — SENNA PLUS 1 TABLET(S): 8.6 TABLET ORAL at 21:24

## 2022-01-01 RX ADMIN — DEXMEDETOMIDINE HYDROCHLORIDE IN 0.9% SODIUM CHLORIDE 3.32 MICROGRAM(S)/KG/HR: 4 INJECTION INTRAVENOUS at 20:04

## 2022-01-01 RX ADMIN — CHLORHEXIDINE GLUCONATE 15 MILLILITER(S): 213 SOLUTION TOPICAL at 05:07

## 2022-01-01 RX ADMIN — Medication 400 MILLIGRAM(S): at 18:49

## 2022-01-01 RX ADMIN — CHLORHEXIDINE GLUCONATE 15 MILLILITER(S): 213 SOLUTION TOPICAL at 06:27

## 2022-01-01 RX ADMIN — DEXMEDETOMIDINE HYDROCHLORIDE IN 0.9% SODIUM CHLORIDE 3.32 MICROGRAM(S)/KG/HR: 4 INJECTION INTRAVENOUS at 03:53

## 2022-01-01 RX ADMIN — SODIUM CHLORIDE 500 MILLILITER(S): 9 INJECTION INTRAMUSCULAR; INTRAVENOUS; SUBCUTANEOUS at 10:14

## 2022-01-01 RX ADMIN — CHLORHEXIDINE GLUCONATE 15 MILLILITER(S): 213 SOLUTION TOPICAL at 18:33

## 2022-01-01 RX ADMIN — LEVETIRACETAM 400 MILLIGRAM(S): 250 TABLET, FILM COATED ORAL at 10:54

## 2022-10-02 NOTE — ED ADULT NURSE NOTE - EXTENSIONS OF SELF_ADULT
07/28/20                            Ragini Lewis  [address] [facility] [address]    To Whom It May Concern:    This is to certify Ragini Lewis was evaluated with Leila Tamez PA-C on 07/28/20 and can return to regular work on 8/3/2020.     RESTRICTIONS: None.               Leila Tamez PA-C  [facility]  [address] [facility] [address]  Phone: [phone]    
None

## 2022-10-02 NOTE — ED ADULT TRIAGE NOTE - AS HEIGHT TYPE
stated
Abdominal Pain    Many things can cause abdominal pain. Many times, abdominal pain is not caused by a disease and will improve without treatment. Your health care provider will do a physical exam to determine if there is a dangerous cause of your pain; blood tests and imaging may help determine the cause of your pain. However, in many cases, no cause may be found and you may need further testing as an outpatient. Monitor your abdominal pain for any changes.     SEEK IMMEDIATE MEDICAL CARE IF YOU HAVE ANY OF THE FOLLOWING SYMPTOMS: worsening abdominal pain, uncontrollable vomiting, profuse diarrhea, inability to have bowel movements or pass gas, black or bloody stools, fever accompanying chest pain or back pain, or fainting. These symptoms may represent a serious problem that is an emergency. Do not wait to see if the symptoms will go away. Get medical help right away. Call 911 and do not drive yourself to the hospital.

## 2022-10-02 NOTE — ED PROVIDER NOTE - OBJECTIVE STATEMENT
Pt is a 75 yo F brought in by EMS for unresponsiveness.  EMs reports  called.  Last known well was 11 PM.  at 515 this morning  heard her yelling and found her on the floor either on the way to or from the bathroom.  Pt unable to provide hx.

## 2022-10-02 NOTE — ED PROVIDER NOTE - NSICDXPASTMEDICALHX_GEN_ALL_CORE_FT
PAST MEDICAL HISTORY:  Asthma     Congestive heart failure     Glaucoma     HTN (hypertension)     Stroke

## 2022-10-02 NOTE — ED PROVIDER NOTE - CARE PLAN
1 Principal Discharge DX:	Acute spont intraparenchymal hemorrhage assoc w/ hypertension   Principal Discharge DX:	Acute spont intraparenchymal hemorrhage assoc w/ hypertension  Goal:	pt will be admitted to the neuro icu for blood pressure goals  Assessment and plan of treatment:	BLOOD PRESSURE CONTROL

## 2022-10-02 NOTE — ED ADULT TRIAGE NOTE - CHIEF COMPLAINT QUOTE
Pt. BIBA r/o CVA. Pt. last seen at 2300 last night, Pt. found on floor this morning, right sided weakness. Pt. is minimally responsive on arrival, left sided gaze, hypertensive. MD Shutbrenden at bedside for eval. Code stroke called.

## 2022-10-02 NOTE — PROGRESS NOTE ADULT - SUBJECTIVE AND OBJECTIVE BOX
SUMMARY:HPI:  Pt is a 75 yo F brought in by EMS for unresponsiveness.   He called EMS.  Last known well was 11 PM on 10/1/22.  At 515 this morning  heard her yelling and found her on the floor either on the way to or from the bathroom.  Pt unable to provide hx.    ADMISSION SCORES:   ICH score - 5;  Baseline mRS- 4  GCS - 3 T  Allergies    latex (Hives)  Levaquin (Swelling; Rash)  penicillin (Swelling; Rash)        REVIEW OF SYSTEMS: [x ] Unable to Assess due to neurologic exam    Neuro: [ ] Headache [ ] Back pain [ ] Numbness [ ] Weakness [ ] Ataxia [ ] Dizziness [ ] Aphasia [ ] Dysarthria [ ] Visual disturbance  Resp: [ ] Shortness of breath/dyspnea, [ ] Orthopnea [ ] Cough  CV: [ ] Chest pain [ ] Palpitation [ ] Lightheadedness [ ] Syncope  Renal: [ ] Thirst [ ] Edema  GI: [ ] Nausea [ ] Emesis [ ] Abdominal pain [ ] Constipation [ ] Diarrhea  Hem: [ ] Hematemesis [ ] bright red blood per rectum  ID: [ ] Fever [ ] Chills [ ] Dysuria  ENT: [ ] Rhinorrhea    DEVICES:    [x ] ET tube [ X] anderson     VITALS: [X ] Reviewed  Vital Signs Last 24 Hrs  T(C): 36.4 (02 Oct 2022 07:18), Max: 36.4 (02 Oct 2022 07:18)  T(F): 97.5 (02 Oct 2022 07:18), Max: 97.5 (02 Oct 2022 07:18)  HR: 70 (02 Oct 2022 09:15) (63 - 94)  BP: 143/75 (02 Oct 2022 09:15) (137/69 - 240/122)  RR: 16 (02 Oct 2022 07:55) (16 - 16)  SpO2: 100% (02 Oct 2022 09:15) (96% - 100%)    Parameters below as of 02 Oct 2022 09:15  Patient On (Oxygen Delivery Method): ventilator      CAPILLARY BLOOD GLUCOSE    Mode: AC/ CMV (Assist Control/ Continuous Mandatory Ventilation)  RR (machine): 24  TV (machine): 450  FiO2: 50  PEEP: 5  MAP: 8  PIP: 24    MEDICATIONS  (STANDING):  chlorhexidine 0.12% Liquid 15 milliLiter(s) Oral Mucosa every 12 hours  niCARdipine Infusion 5 mG/Hr (25 mL/Hr) IV Continuous <Continuous>  potassium chloride  10 mEq/100 mL IVPB 10 milliEquivalent(s) IV Intermittent every 1 hour    MEDICATIONS  (PRN):        LABS:  PT/INR - ( 02 Oct 2022 07:10 )   PT: 12.6 sec;   INR: 1.09 ratio    PTT - ( 02 Oct 2022 07:10 )  PTT:30.9 sec      10-02    142  |  106  |  11.7  ----------------------------<  194<H>  3.3<L>   |  24.0  |  0.96    Ca    9.8      02 Oct 2022 07:10    TPro  7.1  /  Alb  3.6  /  TBili  0.3<L>  /  DBili  x   /  AST  18  /  ALT  9   /  AlkPhos  90  10-02                          12.5   13.16 )-----------( 342      ( 02 Oct 2022 07:10 )             38.3       STROKE CORE MEASURES:   HGBA1C  LDL  Trig      IMAGING/DATA: [ X] Reviewed     CT Brain Stroke Protocol (10.02.22 @ 07:28) >    IMPRESSION:    Large parenchymal hemorrhage centered on the right basal ganglia and   thalamus with intraventricular extension, 1.6 cm of rightward midline   shift, and hydrocephalus.    Prominence of the pituitary gland with superior convexity. Consider   nonemergent follow-up MRI with pituitary protocol to evaluate for   underlying lesion, as clinically warranted, if no contraindications exist.    EKG - Nl sinus rhythmn      EXAMINATION:  PHYSICAL EXAM:    Constitutional: No Acute Distress     Neurological: Intubated , no need for sedation   Pupils 5mm L NR and 3 mm R NR , No dolls , No corneal , No gag or cough, No grimacing to noxious stimuli , no spont movement    Motor exam:          Upper extremity                         Delt     Bicep     Tricep    HG                                                 R       Flaccid                                               L       Flaccid          Lower extremity                        HF         KF        KE       DF         PF                                                  R       Flaccid                                               L        Flaccid                                                 Sensation:Not intact     Pulmonary: Clear to Auscultation,    Cardiovascular: S1, S2, Regular rate and rhythm     Gastrointestinal: Soft, Non-tender, Non-distended

## 2022-10-02 NOTE — PROGRESS NOTE ADULT - ASSESSMENT
ASSESSMENT/PLAN: 75 yo female spont L ICH  L ICH M- likely secondary to amyloid vs HTN  Resp failure  Brain compression   HTN Urgency  Hypokalemic       NEURO:  Neuro checks q 1 hr  Case discussed with Neurosurgery and deemed not surgery candidate based on mRS and GCS  GOC with  Ron - Cell - [phone], Home - [phone] - expressed her lack of independence at home requiring ADL's by him who makes all medical decisions.    High risk for EVD- sig brain shift and no chance of viable outcome with improvement  S/P 70 grams Mannitol  Maintain Na - 14-- 145 - secondary to shift and matteo hematoma edema  Keppra 500mg q 12  Activity:  [X] Bedrest      PULM: Resp failure secondary to neurologic injury   Intubated   ABG   Goal CO2 - 32- 38   Maintain  O2 sats > 92 %  CXR      CV: HTN urgency  Cardene at 60 cc/hr   EKG - Nl sinus rhythmn  Maintain Nl electrolytes  Troponin  SBP goal- SBp 120- < 160    RENAL: Hypokalemia   Check electrolytes ; suppl K   monitor urine output  DI watch  Check urine osmolality   Fluids: maintain Total goal 60 cc/hr     GI:  Diet: NPO for now  GI prophylaxis  [X ] other: Pepcid 20mg q12  Bowel regimen  [X] senna     ENDO:   HGBA1C   TSH  LDL  Trig   Goal euglycemia (-180)    HEME/ONC: Mild leucocytosis likely stress related   VTE prophylaxis: x[] SCDs [] chemoprophylaxis- held due to Acute ICH     ID: COVID Pos - Assymptomatic  Afebrile     SOCIAL/FAMILY:  [X] updated at bedside- Ron Beck     CODE STATUS:   [X] DNR I [X] Palliative/Comfort Care    DISPOSITION:  [X] ICU    [X] Patient is at high risk of neurologic deterioration/death due to:  brain swelling ; herniation and bleeding    Time spent:50 critical care minutes

## 2022-10-02 NOTE — H&P ADULT - NSHPREVIEWOFSYSTEMS_GEN_ALL_CORE
REVIEW OF SYSTEMS  due to the patient being altered the review of system have not been able to be obtained.

## 2022-10-02 NOTE — H&P ADULT - NSHPPHYSICALEXAM_GEN_ALL_CORE
PHYSICAL EXAM: Pt initially upon arrival was noted by the attending ED to have decerebrate posture.  Pt was taken to ct scan at which time she began to vomit and pt was then intubated by the ED  GCS E1v1m2=3  MRS 5  Constitutional:    Eyes: right eye 3mm, left eye 6mm pos arcus     ENMT: intubated, sedation on board.     Neck: collar inplace.     Breasts: n/a    Back: neg deform    Respiratory: clear bs, fair air entry    Cardiovascular: reg rate.    Gastrointestinal: soft large pos bs    Genitourinary: anderson     Extremities: flaccid bilat upper and lower s/p meds for intubated.     Vascular:     Neurological: obtunded. comatose, intubated, neg corneals, neg withdrawal with noxious stimuli     Skin: neg lesions    Lymph Nodes: neg    Musculoskeletal: non tender    Psychiatric:

## 2022-10-02 NOTE — H&P ADULT - NSHPLABSRESULTS_GEN_ALL_CORE
12.5   13.16 )-----------( 342      ( 02 Oct 2022 07:10 )             38.3     10-02    142  |  106  |  11.7  ----------------------------<  194<H>  3.3<L>   |  24.0  |  0.96    Ca    9.8      02 Oct 2022 07:10    TPro  7.1  /  Alb  3.6  /  TBili  0.3<L>  /  DBili  x   /  AST  18  /  ALT  9   /  AlkPhos  90  10-02    PT/INR - ( 02 Oct 2022 07:10 )   PT: 12.6 sec;   INR: 1.09 ratio         PTT - ( 02 Oct 2022 07:10 )  PTT:30.9 sec    < from: CT Brain Stroke Protocol (10.02.22 @ 07:28) >  IMPRESSION:  Large parenchymal hemorrhage centered on the right basal ganglia and   thalamus with intraventricular extension, 1.6 cm of rightward midline   shift, and hydrocephalus.  Prominence of the pituitary gland with superior convexity. Consider   nonemergent follow-up MRI with pituitary protocol to evaluate for   underlying lesion, as clinically warranted, if no contraindications exist.  Dr. Aleman discussed the above findings withDr. Choi at 7:27 AM on   10/2/2022 with read back.  --- End of Report ---   end of copied text >

## 2022-10-02 NOTE — ED PROVIDER NOTE - PHYSICAL EXAMINATION
Constitutional - well-developed.   Head - NCAT. Airway patent.   Eyes - R pupil fixed and dilated.  CV - RRR. no murmur. no edema.   Pulm - CTAB.   Abd - soft, nt. no rebound. no guarding.   Neuro - obtunded. initially not moving R-side of body them became decerebrate.  Skin - No rash. .  MSK - normal ROM. Constitutional - well-developed.   Head - NCAT. Airway patent.   Eyes - R pupil fixed and dilated. no corneals.   neck supple no gag.   CV - RRR. no murmur. no edema.   Pulm - CTAB.   Abd - soft, nt. no rebound. no guarding.   Neuro - obtunded. initially not moving R-side of body them became decerebrate.  Skin - No rash. .  MSK - normal ROM.

## 2022-10-02 NOTE — H&P ADULT - HISTORY OF PRESENT ILLNESS
This is a 76yF brought in by EMS unresponsive.   Pt last well known 11 pm. Pt was heard by the  yelling at 5:15 am and he found her on the floor near the bathroom.  Pt was unresp therefore, unable to provide any hx. Pt was taken to scan pt at the time was noted by the ED attending to have decerebrate posturing of the upper extrem bilat

## 2022-10-02 NOTE — H&P ADULT - ASSESSMENT
This is a 76yf presented unresp  hx of DM<HTN, congested heart failure  Ct of the brain demonstrated a right sided parenchymal hemorrhage on the right BG and thalamus with IVH and midline shift and hydrocephalus.      Plan  -pt will need to be admitted to the ICU for neuro ck and monitoring vital signs  -pt will need blood pressure control at this time she is on cardene to maintain blood pressure less than equal sbp 150 mm hg.  - pt will need a cta of the brain unfortunately during her initial scanning pt begain vomiting during the time frame that the cta was being   - due to the urgency of this case pt was presented to Dr Roman who was the 2nd .  Pt has been deemed to be medically managed.   -Dr Ponce will assist managing this patient from a ICU standpoint.

## 2022-10-02 NOTE — ED ADULT NURSE NOTE - OBJECTIVE STATEMENT
Pt biba unresponsive last known well 11pm last night,  found pt on floor at 515am ( per EMS), + r pupil dilated. initially not moving r side. + postering noted while in ct scan. Dr. Choi at bedside

## 2022-10-02 NOTE — ED PROVIDER NOTE - CLINICAL SUMMARY MEDICAL DECISION MAKING FREE TEXT BOX
Code stroke called on arrival and patient taken directly to CT.  CT revealed large intraparenchymal hemorrhage with IV extension.  Plan was for CTA but patient began to vomit on CT table and was brought back to critical for intubation for airway protection.  Pt also hypertensive and started on cardene for bp control. neurosurgery consulted and neuroicu to admit for further medical management. Code stroke called on arrival and patient taken directly to CT.  CT revealed large intraparenchymal hemorrhage with IV extension.  Plan was for CTA but patient began to vomit on CT table and was brought back to critical for intubation for airway protection.  Pt also hypertensive and started on cardene for bp control. neurosurgery consulted and neuro icu to admit for further medical management.

## 2022-10-03 NOTE — CONSULT NOTE ADULT - ASSESSMENT
76yr woman hx HTN CHF CVA admitted with right IVH with midline shift     76yr woman hx HTN CHF CVA admitted with right IVH with midline shift, COVID+    IVH with shift  plan per NSICU  neurochecks    Acute Respiratory Failure  wean as tolerated  monitor O2    Hypertensive Urgency  monitoring BPs    COVID +  asymptomatic  monitor for symptoms    Encounter for Palliative Care  PC consulted to assist with GOC.    Patient made DNR by NSICU  Spoke to .  Reports  he assists wife with all ADLS - uses walker, but mainly sitting in chair with computer.  He hopes she is getting better.  Psychosocial support  Plan to meet with  tomorrow about 2pm. Patient is debilitated at baseline.   will need further understanding of long term outlook given level of brain injury.

## 2022-10-03 NOTE — PROGRESS NOTE ADULT - SUBJECTIVE AND OBJECTIVE BOX
HPI: This is a 76yF brought in by EMS unresponsive.   Pt last well known 11 pm. Pt was heard by the  yelling at 5:15 am and he found her on the floor near the bathroom.  Pt was unresp therefore, unable to provide any hx. Pt was taken to scan pt at the time was noted by the ED attending to have decerebrate posturing of the upper extrem bilat (02 Oct 2022 13:58)      INTERVAL HPI/OVERNIGHT EVENTS:  No acute events overnight. Patient comatose.       Vital Signs Last 24 Hrs  T(C): 37.7 (03 Oct 2022 03:30), Max: 38.7 (02 Oct 2022 18:30)  T(F): 99.9 (03 Oct 2022 03:30), Max: 101.7 (02 Oct 2022 18:30)  HR: 97 (03 Oct 2022 03:30) (63 - 101)  BP: 146/85 (03 Oct 2022 03:30) (111/64 - 240/122)  BP(mean): 101 (03 Oct 2022 03:30) (78 - 120)  RR: 24 (03 Oct 2022 03:30) (16 - 33)  SpO2: 100% (03 Oct 2022 01:30) (93% - 100%)    Parameters below as of 03 Oct 2022 00:00  Patient On (Oxygen Delivery Method): room air      PHYSICAL EXAM:    Patient comatose. Pupils anisocoric and NR. + cough + gag, - dolls.   No movement to deep noxious      LABS:                        12.5   13.16 )-----------( 342      ( 02 Oct 2022 07:10 )             38.3     10-02    142  |  106  |  11.7  ----------------------------<  194<H>  3.3<L>   |  24.0  |  0.96    Ca    9.8      02 Oct 2022 07:10    TPro  7.1  /  Alb  3.6  /  TBili  0.3<L>  /  DBili  x   /  AST  18  /  ALT  9   /  AlkPhos  90  10-02    PT/INR - ( 02 Oct 2022 07:10 )   PT: 12.6 sec;   INR: 1.09 ratio         PTT - ( 02 Oct 2022 07:10 )  PTT:30.9 sec      10-02 @ 07:01  -  10-03 @ 03:59  --------------------------------------------------------  IN: 831.5 mL / OUT: 1030 mL / NET: -198.5 mL        RADIOLOGY & ADDITIONAL TESTS:    < from: CT Brain Stroke Protocol (10.02.22 @ 07:28) >  IMPRESSION:    Large parenchymal hemorrhage centered on the right basal ganglia and   thalamus with intraventricular extension, 1.6 cm of rightward midline   shift, and hydrocephalus.    Prominence of the pituitary gland with superior convexity. Consider   nonemergent follow-up MRI with pituitary protocol to evaluate for   underlying lesion, as clinically warranted, if no contraindications exist.    Dr. Aleman discussed the above findings withDr. Choi at 7:27 AM on   10/2/2022 with read back.    --- End of Report ---

## 2022-10-03 NOTE — PROGRESS NOTE ADULT - ASSESSMENT
ASSESSMENT/PLAN: 77 yo female spont L ICH  L ICH M- likely secondary to amyloid vs HTN  Resp failure  Brain compression   HTN Urgency  Hypokalemic       Neuro checks q 2 hr  Not a surgical candidate based on mRS and GCS  S/P 70 grams Mannitol  Maintain Na - 140- 145 shift and matteo hematoma edema  Keppra 500mg q 12  Intubated   SBP goal- - 160  DI watch, check UOP  Check urine osmolality   Covid + - asymptomatic  Patient is DNR

## 2022-10-03 NOTE — PROGRESS NOTE ADULT - SUBJECTIVE AND OBJECTIVE BOX
SUMMARY:HPI:  Pt is a 75 yo F brought in by EMS for unresponsiveness.   He called EMS.  Last known well was 11 PM on 10/1/22.  At 515 this morning  heard her yelling and found her on the floor either on the way to or from the bathroom.  Pt unable to provide hx.    ADMISSION SCORES:   ICH score - 5;  Baseline mRS- 4  GCS - 3 T  Allergies    latex (Hives)  Levaquin (Swelling; Rash)  penicillin (Swelling; Rash)        REVIEW OF SYSTEMS: [x ] Unable to Assess due to neurologic exam    Neuro: [ ] Headache [ ] Back pain [ ] Numbness [ ] Weakness [ ] Ataxia [ ] Dizziness [ ] Aphasia [ ] Dysarthria [ ] Visual disturbance  Resp: [ ] Shortness of breath/dyspnea, [ ] Orthopnea [ ] Cough  CV: [ ] Chest pain [ ] Palpitation [ ] Lightheadedness [ ] Syncope  Renal: [ ] Thirst [ ] Edema  GI: [ ] Nausea [ ] Emesis [ ] Abdominal pain [ ] Constipation [ ] Diarrhea  Hem: [ ] Hematemesis [ ] bright red blood per rectum  ID: [ ] Fever [ ] Chills [ ] Dysuria  ENT: [ ] Rhinorrhea    DEVICES:    [x ] ET tube [ X] anderson     VITALS: [X ] Reviewed  Vital Signs Last 24 Hrs  T(C): 36.4 (02 Oct 2022 07:18), Max: 36.4 (02 Oct 2022 07:18)  T(F): 97.5 (02 Oct 2022 07:18), Max: 97.5 (02 Oct 2022 07:18)  HR: 70 (02 Oct 2022 09:15) (63 - 94)  BP: 143/75 (02 Oct 2022 09:15) (137/69 - 240/122)  RR: 16 (02 Oct 2022 07:55) (16 - 16)  SpO2: 100% (02 Oct 2022 09:15) (96% - 100%)    Parameters below as of 02 Oct 2022 09:15  Patient On (Oxygen Delivery Method): ventilator      CAPILLARY BLOOD GLUCOSE    Mode: AC/ CMV (Assist Control/ Continuous Mandatory Ventilation)  RR (machine): 24  TV (machine): 450  FiO2: 50  PEEP: 5  MAP: 8  PIP: 24    MEDICATIONS  (STANDING):  chlorhexidine 0.12% Liquid 15 milliLiter(s) Oral Mucosa every 12 hours  niCARdipine Infusion 5 mG/Hr (25 mL/Hr) IV Continuous <Continuous>  potassium chloride  10 mEq/100 mL IVPB 10 milliEquivalent(s) IV Intermittent every 1 hour    MEDICATIONS  (PRN):        LABS:  PT/INR - ( 02 Oct 2022 07:10 )   PT: 12.6 sec;   INR: 1.09 ratio    PTT - ( 02 Oct 2022 07:10 )  PTT:30.9 sec      10-02    142  |  106  |  11.7  ----------------------------<  194<H>  3.3<L>   |  24.0  |  0.96    Ca    9.8      02 Oct 2022 07:10    TPro  7.1  /  Alb  3.6  /  TBili  0.3<L>  /  DBili  x   /  AST  18  /  ALT  9   /  AlkPhos  90  10-02                          12.5   13.16 )-----------( 342      ( 02 Oct 2022 07:10 )             38.3       STROKE CORE MEASURES:   HGBA1C  LDL  Trig      IMAGING/DATA: [ X] Reviewed     CT Brain Stroke Protocol (10.02.22 @ 07:28) >    IMPRESSION:    Large parenchymal hemorrhage centered on the right basal ganglia and   thalamus with intraventricular extension, 1.6 cm of rightward midline   shift, and hydrocephalus.    Prominence of the pituitary gland with superior convexity. Consider   nonemergent follow-up MRI with pituitary protocol to evaluate for   underlying lesion, as clinically warranted, if no contraindications exist.    EKG - Nl sinus rhythmn      EXAMINATION:  PHYSICAL EXAM:    Constitutional: No Acute Distress     Neurological: Intubated , no need for sedation   Pupils 5mm L NR and 3 mm R NR , No dolls , No corneal , No gag or cough, No grimacing to noxious stimuli , no spont movement    Motor exam:          Upper extremity                         Delt     Bicep     Tricep    HG                                                 R       Flaccid                                               L       Flaccid          Lower extremity                        HF         KF        KE       DF         PF                                                  R       Flaccid                                               L        Flaccid                                                 Sensation:Not intact     Pulmonary: Clear to Auscultation,    Cardiovascular: S1, S2, Regular rate and rhythm     Gastrointestinal: Soft, Non-tender, Non-distended           SUMMARY:HPI:  Pt is a 75 yo F brought in by EMS for unresponsiveness.   He called EMS.  Last known well was 11 PM on 10/1/22.  At 515 this morning  heard her yelling and found her on the floor either on the way to or from the bathroom.  Pt unable to provide hx.    ADMISSION SCORES:   ICH score - 5;  Baseline mRS- 4  GCS - 3 T  Allergies    latex (Hives)  Levaquin (Swelling; Rash)  penicillin (Swelling; Rash)        REVIEW OF SYSTEMS: [x ] Unable to Assess due to neurologic exam    Neuro: [ ] Headache [ ] Back pain [ ] Numbness [ ] Weakness [ ] Ataxia [ ] Dizziness [ ] Aphasia [ ] Dysarthria [ ] Visual disturbance  Resp: [ ] Shortness of breath/dyspnea, [ ] Orthopnea [ ] Cough  CV: [ ] Chest pain [ ] Palpitation [ ] Lightheadedness [ ] Syncope  Renal: [ ] Thirst [ ] Edema  GI: [ ] Nausea [ ] Emesis [ ] Abdominal pain [ ] Constipation [ ] Diarrhea  Hem: [ ] Hematemesis [ ] bright red blood per rectum  ID: [ ] Fever [ ] Chills [ ] Dysuria  ENT: [ ] Rhinorrhea    DEVICES:    [x ] ET tube [ X] anderson     VITALS: [X ] Reviewed  Vital Signs Last 24 Hrs  T(C): 36.4 (02 Oct 2022 07:18), Max: 36.4 (02 Oct 2022 07:18)  T(F): 97.5 (02 Oct 2022 07:18), Max: 97.5 (02 Oct 2022 07:18)  HR: 70 (02 Oct 2022 09:15) (63 - 94)  BP: 143/75 (02 Oct 2022 09:15) (137/69 - 240/122)  RR: 16 (02 Oct 2022 07:55) (16 - 16)  SpO2: 100% (02 Oct 2022 09:15) (96% - 100%)    Parameters below as of 02 Oct 2022 09:15  Patient On (Oxygen Delivery Method): ventilator      CAPILLARY BLOOD GLUCOSE    Mode: AC/ CMV (Assist Control/ Continuous Mandatory Ventilation)  RR (machine): 24  TV (machine): 450  FiO2: 50  PEEP: 5  MAP: 8  PIP: 24    MEDICATIONS  (STANDING):  chlorhexidine 0.12% Liquid 15 milliLiter(s) Oral Mucosa every 12 hours  niCARdipine Infusion 5 mG/Hr (25 mL/Hr) IV Continuous <Continuous>  potassium chloride  10 mEq/100 mL IVPB 10 milliEquivalent(s) IV Intermittent every 1 hour    MEDICATIONS  (PRN):        LABS:  PT/INR - ( 02 Oct 2022 07:10 )   PT: 12.6 sec;   INR: 1.09 ratio    PTT - ( 02 Oct 2022 07:10 )  PTT:30.9 sec      10-02    142  |  106  |  11.7  ----------------------------<  194<H>  3.3<L>   |  24.0  |  0.96    Ca    9.8      02 Oct 2022 07:10    TPro  7.1  /  Alb  3.6  /  TBili  0.3<L>  /  DBili  x   /  AST  18  /  ALT  9   /  AlkPhos  90  10-02                          12.5   13.16 )-----------( 342      ( 02 Oct 2022 07:10 )             38.3       STROKE CORE MEASURES:   HGBA1C  LDL  Trig      IMAGING/DATA: [ X] Reviewed     CT Brain Stroke Protocol (10.02.22 @ 07:28) >    IMPRESSION:    Large parenchymal hemorrhage centered on the right basal ganglia and   thalamus with intraventricular extension, 1.6 cm of rightward midline   shift, and hydrocephalus.    Prominence of the pituitary gland with superior convexity. Consider   nonemergent follow-up MRI with pituitary protocol to evaluate for   underlying lesion, as clinically warranted, if no contraindications exist.    EKG - Nl sinus rhythmn      EXAMINATION:  PHYSICAL EXAM:    Constitutional: No Acute Distress     Neurological: Intubated , no need for sedation   Pupils 5mm L 5mm R and 3 mm R R , No dolls , No corneal , Pos gag or cough, No grimacing to noxious stimuli , no spont movement    Motor exam:          Upper extremity                         Delt     Bicep     Tricep    HG                                                 R       R decorticate                                                L       L decrebrate           Lower extremity                        HF         KF        KE       DF         PF                                                  R       Flaccid                                               L        Flaccid                                                 Sensation:Not intact     Pulmonary: Clear to Auscultation,    Cardiovascular: S1, S2, Regular rate and rhythm     Gastrointestinal: Soft, Non-tender, Non-distended

## 2022-10-03 NOTE — CONSULT NOTE ADULT - SUBJECTIVE AND OBJECTIVE BOX
HPI:  Unable to obtain secondary to poor mentation/historian  This is a 76yF brought in by EMS unresponsive.   Pt last well known 11 pm. Pt was heard by the  yelling at 5:15 am and he found her on the floor near the bathroom.  Pt was unresp therefore, unable to provide any hx. Pt was taken to scan pt at the time was noted by the ED attending to have decerebrate posturing of the upper extrem bilat (02 Oct 2022 13:58)      PERTINENT PMH REVIEWED: Yes     PAST MEDICAL & SURGICAL HISTORY:  Asthma      HTN (hypertension)      Congestive heart failure      Glaucoma      Stroke      S/P hysterectomy      S/P appendectomy      SOCIAL HISTORY: Hx from chart -  Unable to obtain secondary to poor mentation/historian                    Substance history:                     Admitted from:  home                     Scientology/spirituality: [employer]                    Cultural concerns:                      Surrogate/HCP/Guardian: Phone#: Ron Beck -     FAMILY HISTORY:      Allergies    latex (Hives)  Levaquin (Swelling; Rash)  penicillin (Swelling; Rash)    Intolerances        ADVANCE DIRECTIVES/TREATMENT PREFERENCES:  DNR continue all other medical treatments      Baseline ADLs (prior to admission): Unable to obtain secondary to poor mentation/historian  Independent/ Dependent      [email or web address]    Present Symptoms:   Dyspnea:  No - inutbated  Nausea/Vomiting:  No   Anxiety:   No    DepressioUnable  Fatigue: Yes   Loss of appetite:   N/A  NPO  Constipation:  Not reported      Pain:    No signs            Character-            Duration-            Factors-            Severity    Pain AD Score:  [email or web address] (press ctrl + left click to view)    Review of Systems: Reviewed                    Unable to obtain due to poor mentation       MEDICATIONS  (STANDING):  chlorhexidine 0.12% Liquid 15 milliLiter(s) Oral Mucosa every 12 hours  chlorhexidine 2% Cloths 1 Application(s) Topical daily  famotidine Injectable 20 milliGRAM(s) IV Push daily  latanoprost 0.005% Ophthalmic Solution 1 Drop(s) Both EYES at bedtime  levETIRAcetam  IVPB 500 milliGRAM(s) IV Intermittent every 12 hours  niCARdipine Infusion 5 mG/Hr (25 mL/Hr) IV Continuous <Continuous>  potassium chloride  10 mEq/100 mL IVPB 10 milliEquivalent(s) IV Intermittent every 1 hour    MEDICATIONS  (PRN):      PHYSICAL EXAM:    Vital Signs Last 24 Hrs  T(C): 37.6 (03 Oct 2022 10:30), Max: 38.7 (02 Oct 2022 18:30)  T(F): 99.7 (03 Oct 2022 10:30), Max: 101.7 (02 Oct 2022 18:30)  HR: 97 (03 Oct 2022 10:30) (70 - 101)  BP: 140/75 (03 Oct 2022 10:30) (111/64 - 189/100)  BP(mean): 96 (03 Oct 2022 10:30) (78 - 120)  RR: 25 (03 Oct 2022 10:30) (20 - 33)  SpO2: 99% (03 Oct 2022 09:30) (93% - 100%)    Parameters below as of 03 Oct 2022 08:00  Patient On (Oxygen Delivery Method): ventilator    O2 Concentration (%): 40    Karnofsky     %  General:    HEENT: NCAT      (  )  ET tube   (    ) NGT  Lungs: comfortable  CV:   GI:           (  )  PEG  :   MSK: normal   weak  chair/bedbound  Neuro:   Skin: warm dry    LABS:                        13.8   22.39 )-----------( 365      ( 03 Oct 2022 04:00 )             41.1     10-03    145  |  108<H>  |  18.0  ----------------------------<  161<H>  3.5   |  19.0<L>  |  1.27    Ca    10.0      03 Oct 2022 04:00  Phos  2.3     10-03  Mg     2.0     10-03    TPro  7.4  /  Alb  3.4  /  TBili  0.8  /  DBili  x   /  AST  19  /  ALT  9   /  AlkPhos  95  10-03    PT/INR - ( 02 Oct 2022 07:10 )   PT: 12.6 sec;   INR: 1.09 ratio         PTT - ( 02 Oct 2022 07:10 )  PTT:30.9 sec  Urinalysis Basic - ( 03 Oct 2022 04:00 )    Color: Yellow / Appearance: Clear / S.005 / pH: x  Gluc: x / Ketone: Negative  / Bili: Negative / Urobili: Negative mg/dL   Blood: x / Protein: Negative / Nitrite: Negative   Leuk Esterase: Negative / RBC: 0-2 /HPF / WBC 0-2 /HPF   Sq Epi: x / Non Sq Epi: Occasional / Bacteria: Occasional      I&O's Summary    02 Oct 2022 07:01  -  03 Oct 2022 07:00  --------------------------------------------------------  IN: 1281.5 mL / OUT: 2195 mL / NET: -913.5 mL    03 Oct 2022 07:01  -  03 Oct 2022 10:50  --------------------------------------------------------  IN: 912.5 mL / OUT: 90 mL / NET: 822.5 mL        RADIOLOGY & ADDITIONAL STUDIES:  < from: CT Brain Stroke Protocol (10.02.22 @ 07:28) >    ACC: [id number] EXAM:  CT BRAIN STROKE PROTOCOL                          PROCEDURE DATE:  10/02/2022          INTERPRETATION:  CLINICAL INFORMATION: Stroke code.    COMPARISON: CT head of 2014.    PROCEDURE:  Noncontrast CT of the Head was performed from the skull base to the   vertex. Coronal and Sagittal reformats were obtained.    FINDINGS:    7.1 x 4.4 x 4.7 cm (3:24 and 6:23) parenchymal hemorrhage centered on the   left basal ganglia, with additional hemorrhage within the left thalamus.   Intraventricular extension of hemorrhage predominantly involving the left   lateral, third, and fourth ventricles. Rightward midline shift of   approximately 1.6 cm with associated dilatation of the right lateral   ventricle. Mild patchy hypodensities within the periventricular and   subcortical white matter, although nonspecific, likely reflect chronic   microvascular disease. Nonspecific prominence of the pituitary gland,   with superior convexity, which appears slightly more prominent since   prior examination 2014.    Ostiomeatal unit pattern of obstruction involving the left frontal,   anterior ethmoid, and maxillary sinuses as well as additional right   maxillary sinus mucosal thickening. Mastoid air cells are clear.   Intraorbital contents are unremarkable. Calvarium is intact.    IMPRESSION:    Large parenchymal hemorrhage centered on the right basal ganglia and   thalamus with intraventricular extension, 1.6 cm of rightward midline   shift, and hydrocephalus.    Prominence of the pituitary gland with superior convexity. Consider   nonemergent follow-up MRI with pituitary protocol to evaluate for   underlying lesion, as clinically warranted, if no contraindications exist.    Dr. Aleman discussed the above findings withDr. Choi at 7:27 AM on   10/2/2022 with read back.    --- End of Report ---      < end of copied text >        < from: Xray Chest 1 View- PORTABLE-Urgent (Xray Chest 1 View- PORTABLE-Urgent .) (10.02.22 @ 08:13) >    ACC: [id number] EXAM:  XR CHEST PORTABLE URGENT 1V                          PROCEDURE DATE:  10/02/2022          INTERPRETATION:  AP chest on 2022 at 7:57 AM. Patient was   intubated.    Shallow inspiration crowds the chest. Heart magnified by technique.    Minimal linear scar left base laterally again noted.    Above findings are similar to December 10, 2014.    Present film shows endotracheal tube and nasogastric tube inserted.    The endotracheal tube is slightly above the chanel. Consider some   withdrawal.    IMPRESSION: Endotracheal tube tip is slightly above chanel. Consider some   withdrawal.    --- End of Report ---      < end of copied text >       HPI:  Unable to obtain secondary to poor mentation/historian  This is a 76yF brought in by EMS unresponsive.   Pt last well known 11 pm. Pt was heard by the  yelling at 5:15 am and he found her on the floor near the bathroom.  Pt was unresp therefore, unable to provide any hx. Pt was taken to scan pt at the time was noted by the ED attending to have decerebrate posturing of the upper extrem bilat (02 Oct 2022 13:58)      PERTINENT PMH REVIEWED: Yes     PAST MEDICAL & SURGICAL HISTORY:  Asthma      HTN (hypertension)      Congestive heart failure      Glaucoma      Stroke      S/P hysterectomy      S/P appendectomy      SOCIAL HISTORY: Hx from chart -  Unable to obtain secondary to poor mentation/historian                    Substance history:                     Admitted from:  home                     Catholic/spirituality: [employer]                    Cultural concerns:                      Surrogate/HCP/Guardian: Phone#: Ron Beck -     FAMILY HISTORY:      Allergies    latex (Hives)  Levaquin (Swelling; Rash)  penicillin (Swelling; Rash)    Intolerances        ADVANCE DIRECTIVES/TREATMENT PREFERENCES:  DNR continue all other medical treatments      Baseline ADLs (prior to admission): Unable to obtain secondary to poor mentation/historian  Independent/ Dependent      [email or web address]    Present Symptoms:   Dyspnea:  No - inutbated  Nausea/Vomiting:  No   Anxiety:   No    DepressioUnable  Fatigue: Yes   Loss of appetite:   N/A  NPO  Constipation:  Not reported      Pain:    No signs            Character-            Duration-            Factors-            Severity    Pain AD Score:  [email or web address] (press ctrl + left click to view)    Review of Systems: Reviewed                    Unable to obtain due to poor mentation       MEDICATIONS  (STANDING):  chlorhexidine 0.12% Liquid 15 milliLiter(s) Oral Mucosa every 12 hours  chlorhexidine 2% Cloths 1 Application(s) Topical daily  famotidine Injectable 20 milliGRAM(s) IV Push daily  latanoprost 0.005% Ophthalmic Solution 1 Drop(s) Both EYES at bedtime  levETIRAcetam  IVPB 500 milliGRAM(s) IV Intermittent every 12 hours  niCARdipine Infusion 5 mG/Hr (25 mL/Hr) IV Continuous <Continuous>  potassium chloride  10 mEq/100 mL IVPB 10 milliEquivalent(s) IV Intermittent every 1 hour    MEDICATIONS  (PRN):      PHYSICAL EXAM:    Vital Signs Last 24 Hrs  T(C): 37.6 (03 Oct 2022 10:30), Max: 38.7 (02 Oct 2022 18:30)  T(F): 99.7 (03 Oct 2022 10:30), Max: 101.7 (02 Oct 2022 18:30)  HR: 97 (03 Oct 2022 10:30) (70 - 101)  BP: 140/75 (03 Oct 2022 10:30) (111/64 - 189/100)  BP(mean): 96 (03 Oct 2022 10:30) (78 - 120)  RR: 25 (03 Oct 2022 10:30) (20 - 33)  SpO2: 99% (03 Oct 2022 09:30) (93% - 100%)    Parameters below as of 03 Oct 2022 08:00  Patient On (Oxygen Delivery Method): ventilator    O2 Concentration (%): 40    Karnofsky  10   %  General:  F intubated NAD  HEENT: NCAT    (  x)  ET tube   Lungs: comfortable  CV: RR  GI:   soft NTND  : anderson  MSK:    weak  chair/bedbound  Neuro: sedated  Skin: warm dry    LABS:                        13.8   22.39 )-----------( 365      ( 03 Oct 2022 04:00 )             41.1     10-03    145  |  108<H>  |  18.0  ----------------------------<  161<H>  3.5   |  19.0<L>  |  1.27    Ca    10.0      03 Oct 2022 04:00  Phos  2.3     10-03  Mg     2.0     10-03    TPro  7.4  /  Alb  3.4  /  TBili  0.8  /  DBili  x   /  AST  19  /  ALT  9   /  AlkPhos  95  10-03    PT/INR - ( 02 Oct 2022 07:10 )   PT: 12.6 sec;   INR: 1.09 ratio         PTT - ( 02 Oct 2022 07:10 )  PTT:30.9 sec  Urinalysis Basic - ( 03 Oct 2022 04:00 )    Color: Yellow / Appearance: Clear / S.005 / pH: x  Gluc: x / Ketone: Negative  / Bili: Negative / Urobili: Negative mg/dL   Blood: x / Protein: Negative / Nitrite: Negative   Leuk Esterase: Negative / RBC: 0-2 /HPF / WBC 0-2 /HPF   Sq Epi: x / Non Sq Epi: Occasional / Bacteria: Occasional      I&O's Summary    02 Oct 2022 07:01  -  03 Oct 2022 07:00  --------------------------------------------------------  IN: 1281.5 mL / OUT: 2195 mL / NET: -913.5 mL    03 Oct 2022 07:  -  03 Oct 2022 10:50  --------------------------------------------------------  IN: 912.5 mL / OUT: 90 mL / NET: 822.5 mL        RADIOLOGY & ADDITIONAL STUDIES:  < from: CT Brain Stroke Protocol (10.02.22 @ 07:28) >    ACC: [id number] EXAM:  CT BRAIN STROKE PROTOCOL                          PROCEDURE DATE:  10/02/2022          INTERPRETATION:  CLINICAL INFORMATION: Stroke code.    COMPARISON: CT head of 2014.    PROCEDURE:  Noncontrast CT of the Head was performed from the skull base to the   vertex. Coronal and Sagittal reformats were obtained.    FINDINGS:    7.1 x 4.4 x 4.7 cm (3:24 and 6:23) parenchymal hemorrhage centered on the   left basal ganglia, with additional hemorrhage within the left thalamus.   Intraventricular extension of hemorrhage predominantly involving the left   lateral, third, and fourth ventricles. Rightward midline shift of   approximately 1.6 cm with associated dilatation of the right lateral   ventricle. Mild patchy hypodensities within the periventricular and   subcortical white matter, although nonspecific, likely reflect chronic   microvascular disease. Nonspecific prominence of the pituitary gland,   with superior convexity, which appears slightly more prominent since   prior examination 2014.    Ostiomeatal unit pattern of obstruction involving the left frontal,   anterior ethmoid, and maxillary sinuses as well as additional right   maxillary sinus mucosal thickening. Mastoid air cells are clear.   Intraorbital contents are unremarkable. Calvarium is intact.    IMPRESSION:    Large parenchymal hemorrhage centered on the right basal ganglia and   thalamus with intraventricular extension, 1.6 cm of rightward midline   shift, and hydrocephalus.    Prominence of the pituitary gland with superior convexity. Consider   nonemergent follow-up MRI with pituitary protocol to evaluate for   underlying lesion, as clinically warranted, if no contraindications exist.    Dr. Aleman discussed the above findings withDr. Choi at 7:27 AM on   10/2/2022 with read back.    --- End of Report ---      < end of copied text >        < from: Xray Chest 1 View- PORTABLE-Urgent (Xray Chest 1 View- PORTABLE-Urgent .) (10.02.22 @ 08:13) >    ACC: [id number] EXAM:  XR CHEST PORTABLE URGENT 1V                          PROCEDURE DATE:  10/02/2022          INTERPRETATION:  AP chest on 2022 at 7:57 AM. Patient was   intubated.    Shallow inspiration crowds the chest. Heart magnified by technique.    Minimal linear scar left base laterally again noted.    Above findings are similar to December 10, 2014.    Present film shows endotracheal tube and nasogastric tube inserted.    The endotracheal tube is slightly above the chanel. Consider some   withdrawal.    IMPRESSION: Endotracheal tube tip is slightly above chanel. Consider some   withdrawal.    --- End of Report ---      < end of copied text >

## 2022-10-03 NOTE — DISCHARGE NOTE NURSING/CASE MANAGEMENT/SOCIAL WORK - NSDCPEFALRISK_GEN_ALL_CORE
For information on Fall & Injury Prevention, visit: [email or web address] OR  [email or web address] OR  https://www.cdc.gov/steadi/patient.html

## 2022-10-03 NOTE — DISCHARGE NOTE NURSING/CASE MANAGEMENT/SOCIAL WORK - PATIENT PORTAL LINK FT
You can access the FollowMyHealth Patient Portal offered by [facility] by registering at the following website: [email or web address]. By joining [employer]’s FollowMyHealth portal, you will also be able to view your health information using other applications (apps) compatible with our system.

## 2022-10-03 NOTE — CONSULT NOTE ADULT - TIME BILLING
Review of chart documents, labs, imaging. Direct patient assessment,  formulation of care plan. Discussion with  Interdisciplinary  team  NSICU ACP, nurse

## 2022-10-03 NOTE — PROGRESS NOTE ADULT - ASSESSMENT
ASSESSMENT/PLAN: 77 yo female spont L ICH  L ICH M- likely secondary to amyloid vs HTN  Resp failure  Brain compression   HTN Urgency  Hypokalemic       NEURO:  Neuro checks q 1 hr  Case discussed with Neurosurgery and deemed not surgery candidate based on mRS and GCS  GOC with  Ron - Cell - [phone], Home - [phone] - expressed her lack of independence at home requiring ADL's by him who makes all medical decisions.    High risk for EVD- sig brain shift and no chance of viable outcome with improvement  S/P 70 grams Mannitol  Maintain Na - 14-- 145 - secondary to shift and matteo hematoma edema  Keppra 500mg q 12  Activity:  [X] Bedrest      PULM: Resp failure secondary to neurologic injury   Intubated   ABG   Goal CO2 - 32- 38   Maintain  O2 sats > 92 %  CXR      CV: HTN urgency  Cardene at 60 cc/hr   EKG - Nl sinus rhythmn  Maintain Nl electrolytes  Troponin  SBP goal- SBp 120- < 160    RENAL: Hypokalemia   Check electrolytes ; suppl K   monitor urine output  DI watch  Check urine osmolality   Fluids: maintain Total goal 60 cc/hr     GI:  Diet: NPO for now  GI prophylaxis  [X ] other: Pepcid 20mg q12  Bowel regimen  [X] senna     ENDO:   HGBA1C   TSH  LDL  Trig   Goal euglycemia (-180)    HEME/ONC: Mild leucocytosis likely stress related   VTE prophylaxis: x[] SCDs [] chemoprophylaxis- held due to Acute ICH     ID: COVID Pos - Assymptomatic  Afebrile     SOCIAL/FAMILY:  [X] updated at bedside- Ron Beck     CODE STATUS:   [X] DNR I [X] Palliative/Comfort Care    DISPOSITION:  [X] ICU    [X] Patient is at high risk of neurologic deterioration/death due to:  brain swelling ; herniation and bleeding    Time spent:50 critical care minutes ASSESSMENT/PLAN: 77 yo female spont L ICH  L ICH M- likely secondary to amyloid vs HTN  Resp failure  Brain compression   HTN Urgency  Hypokalemic   Coma       NEURO:  Coma checks  Case discussed with Neurosurgery and deemed not surgery candidate based on mRS and GCS  GOC with  Ron - Cell - [phone], Home - [phone] - expressed her lack of independence at home requiring ADL's by him who makes all medical decisions.    High risk for EVD- sig brain shift and no chance of viable outcome with improvement  S/P 70 grams Mannitol on admission   Maintain Na - 140-- 145 - secondary to shift and matteo hematoma edema  Keppra 500mg q 12  Teagan - On   Palliative Care   Activity:  [X] Bedrest      PULM: Resp failure secondary to neurologic injury - intubation   ABG   Capnogram   Goal CO2 - 35- 38   Maintain  O2 sats > 92 %  CXR- R hemidiaphragm disfunction   ETT - raise 2 cm       CV: HTN urgency  Cardene at 60 cc/hr   EKG - Nl sinus rhythm  ECho  Maintain Nl electrolytes  Troponin- < .01  SBP goal- SBp 130- < 160    RENAL:  S/P Hypokalemia   Check electrolytes ; suppl K   monitor urine output  DI watch- If Urine out > 250 cc/hr for 3 consective hrs- sNa ; urine osmolality \      GI:  Jevity- 55cc/hr   GI prophylaxis  [X ] other: Pepcid 20mg q12  Bowel regimen  [X] senna     ENDO:   HGBA1C -0 5.5  TSH- 0.18/ Free T4   LDL- nl   Trig - as above   Goal euglycemia (-180)    HEME/ONC: Mild leucocytosis likely stress related   VTE prophylaxis: x[] SCDs [] chemoprophylaxis- held due to Acute ICH   UA - neg ; CXR - no infiltrate;   Doppler LE     ID: COVID Pos - Asymptomatic  Afebrile      LUE - IV iniltrated     SOCIAL/FAMILY:  [X] updated at bedside- Ron Beck     CODE STATUS:   [X] DNR I [X] Palliative/Comfort Care    DISPOSITION:  [X] ICU    [X] Patient is at high risk of neurologic deterioration/death due to:  brain swelling ; herniation and bleeding    Time spent:50 critical care minutes

## 2022-10-04 NOTE — EEG REPORT - NS EEG TEXT BOX
EEG REPORT     DATE OF STUDY: 10/4/2022    REASON FOR EEG:  The patient is a 75 yo lady who presented with a large L ICH with IVH. She is intubated on ventilator and in coma.   EEG is requested to evaluate for epileptiform abnormality.    MEDICATIONS  (STANDING):  cefepime  Injectable.      chlorhexidine 0.12% Liquid 15 milliLiter(s) Oral Mucosa every 12 hours  chlorhexidine 2% Cloths 1 Application(s) Topical daily  dexMEDEtomidine Infusion 0.2 MICROgram(s)/kG/Hr (3.32 mL/Hr) IV Continuous <Continuous>  famotidine Injectable 20 milliGRAM(s) IV Push daily  latanoprost 0.005% Ophthalmic Solution 1 Drop(s) Both EYES at bedtime  levETIRAcetam 500 milliGRAM(s) Oral two times a day  polyethylene glycol 3350 17 Gram(s) Oral daily  senna 1 Tablet(s) Oral at bedtime    MEDICATIONS  (PRN):  acetaminophen    Suspension .. 650 milliGRAM(s) Enteral Tube every 6 hours PRN Temp greater or equal to 38C (100.4F)  hydrALAZINE Injectable 10 milliGRAM(s) IV Push every 2 hours PRN SBP >160  labetalol Injectable 10 milliGRAM(s) IV Push every 2 hours PRN SBP >160      METHOD: A Standard 21-channel digital EEG was placed according to the 10-20 international system. Standard montages were recorded. Hyperventilation was not performed. Photic stimulation was performed. Photic stimulation was performed.  The duration of the recording is 21 minutes.    DESCRIPTION OF THE RECORDING:  The patient is in comatose and on ventilator.  HR is regular at 80-90 bpm.    Throughout the record, there are asymmetrical background with reduced amplitude over the left hemisphere. The background shows a diffuse 3 Hz delta intermixed with 4-5 Hz theta activity with amplitude of 20-30 uV on the left hemisphere and 40-60 uV on the right hemisphere.  There are independent left mid-temporal spikes observed occasionally.   Sharp transients are seen over the right centro-parietal region.   There is no clinical or electrographic seizure observed.    Video shows no abnormal movement or paroxysmal event.     IMPRESSION:  This is an abnormal 21-minute EEG in comatose patient showin. A focal structural abnormality and/or focal coritcal dysfunction over the left hemisphere.   2. 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    There is no clinical or electrographic seizure observed.

## 2022-10-04 NOTE — DIETITIAN INITIAL EVALUATION ADULT - NS FNS DIET ORDER
Diet, NPO with Tube Feed:   Tube Feeding Modality: Orogastric  Jevity 1.5 Shivam (JEVITY1.5)  Total Volume for 24 Hours (mL): 1320  Continuous  Starting Tube Feed Rate {mL per Hour}: 20  Increase Tube Feed Rate by (mL): 10     Every 4 hours  Until Goal Tube Feed Rate (mL per Hour): 55  Tube Feed Duration (in Hours): 24  Tube Feed Start Time: 10:00 (10-03-22 @ 09:35)

## 2022-10-04 NOTE — PROGRESS NOTE ADULT - ASSESSMENT
76yr woman hx HTN CHF CVA admitted with right IVH with midline shift, COVID+    IVH with shift  plan per NSICU  not a surgical candidate  poor neurological outlook    Acute Respiratory Failure  wean as tolerated  monitor O2    Leukocytosis  IV abx    Hypertensive Urgency  monitoring BPs    COVID +  asymptomatic  monitor     Encounter for Palliative Care   76yr woman hx HTN CHF CVA admitted with right IVH with midline shift, COVID+    IVH with shift  plan per NSICU  not a surgical candidate  poor neurological outlook    Acute Respiratory Failure  wean as tolerated  monitor O2    Leukocytosis  IV abx    Hypertensive Urgency  monitoring BPs    COVID +  asymptomatic  monitor     Encounter for Palliative Care  Originally planned to meet  this afternoon.  He cannot come today but definitely plans to be here in the morning between 10am and 12 noon.   We agreed to meet then.

## 2022-10-04 NOTE — PROGRESS NOTE ADULT - SUBJECTIVE AND OBJECTIVE BOX
Patient is a 76y old  Female who presents with a chief complaint of found unresp (03 Oct 2022 10:49)    HPI:  This is a 76yF brought in by EMS unresponsive.   Pt last well known 11 pm. Pt was heard by the  yelling at 5:15 am and he found her on the floor near the bathroom.  Pt was unresp therefore, unable to provide any hx. Pt was taken to scan pt at the time was noted by the ED attending to have decerebrate posturing of the upper extrem bilat (02 Oct 2022 13:58)      Interval history:  Patient seen and examined by neurosurgery team. No acute events reported per staff. Unable to provide ROS 2/2 neurologic status.       Vital Signs Last 24 Hrs  T(C): 37.6 (04 Oct 2022 01:00), Max: 37.9 (03 Oct 2022 01:45)  T(F): 99.7 (04 Oct 2022 01:00), Max: 100.2 (03 Oct 2022 01:45)  HR: 87 (04 Oct 2022 01:00) (65 - 99)  BP: 152/82 (04 Oct 2022 01:00) (95/57 - 162/81)  BP(mean): 103 (04 Oct 2022 01:00) (70 - 111)  RR: 24 (04 Oct 2022 01:00) (16 - 29)  SpO2: 98% (04 Oct 2022 01:00) (97% - 100%)    Parameters below as of 04 Oct 2022 00:00  Patient On (Oxygen Delivery Method): ventilator      Physical Exam:  Constitutional: intubated   Neuro  * Mental Status: No EO, not following commands, intubated   * Cranial Nerves: R pupil 3mm, L pupil 5mm reactive. + cough/gag, overbreathing the ventilator.   * Motor: RUE extend, LUE no movement, b/l LE TF   * Sensory: moves all extremities to noxious besides LUE  * Reflexes: not assessed   Head: atraumatic, normocephalic  CV: regular rate and rhythm       LABS:                        13.8   22.39 )-----------( 365      ( 03 Oct 2022 04:00 )             41.1     10-03    145  |  108<H>  |  18.0  ----------------------------<  161<H>  3.5   |  19.0<L>  |  1.27    Ca    10.0      03 Oct 2022 04:00  Phos  2.3     10-03  Mg     2.0     10-03    TPro  7.4  /  Alb  3.4  /  TBili  0.8  /  DBili  x   /  AST  19  /  ALT  9   /  AlkPhos  95  10-03    PT/INR - ( 02 Oct 2022 07:10 )   PT: 12.6 sec;   INR: 1.09 ratio    PTT - ( 02 Oct 2022 07:10 )  PTT:30.9 sec    Urinalysis Basic - ( 03 Oct 2022 04:00 )  Color: Yellow / Appearance: Clear / S.005 / pH: x  Gluc: x / Ketone: Negative  / Bili: Negative / Urobili: Negative mg/dL   Blood: x / Protein: Negative / Nitrite: Negative   Leuk Esterase: Negative / RBC: 0-2 /HPF / WBC 0-2 /HPF   Sq Epi: x / Non Sq Epi: Occasional / Bacteria: Occasional    I&O:   I&O's Summary    02 Oct 2022 07:01  -  03 Oct 2022 07:00  --------------------------------------------------------  IN: 1281.5 mL / OUT: 2195 mL / NET: -913.5 mL    03 Oct 2022 07:01  -  04 Oct 2022 01:38  --------------------------------------------------------  IN: 2139 mL / OUT: 645 mL / NET: 1494 mL      Medications:  MEDICATIONS  (STANDING):  chlorhexidine 0.12% Liquid 15 milliLiter(s) Oral Mucosa every 12 hours  chlorhexidine 2% Cloths 1 Application(s) Topical daily  dexMEDEtomidine Infusion 0.2 MICROgram(s)/kG/Hr (3.32 mL/Hr) IV Continuous <Continuous>  famotidine Injectable 20 milliGRAM(s) IV Push daily  latanoprost 0.005% Ophthalmic Solution 1 Drop(s) Both EYES at bedtime  levETIRAcetam  IVPB 500 milliGRAM(s) IV Intermittent every 12 hours  niCARdipine Infusion 5 mG/Hr (25 mL/Hr) IV Continuous <Continuous>  polyethylene glycol 3350 17 Gram(s) Oral daily  senna 1 Tablet(s) Oral at bedtime    MEDICATIONS  (PRN):      RADIOLOGY & ADDITIONAL STUDIES:  No new neurosurgical imaging to review    < from: CT Brain Stroke Protocol (10.02.22 @ 07:28) >  IMPRESSION:    Large parenchymal hemorrhage centered on the right basal ganglia and   thalamus with intraventricular extension, 1.6 cm of rightward midline   shift, and hydrocephalus.    Prominence of the pituitary gland with superior convexity. Consider   nonemergent follow-up MRI with pituitary protocol to evaluate for   underlying lesion, as clinically warranted, if no contraindications exist.    Dr. Aleman discussed the above findings withDr. Choi at 7:27 AM on   10/2/2022 with read back.    --- End of Report ---    JAZMINE ALEMAN MD; Attending Radiologist  This document has been electronically signed. Oct  2 2022  7:40AM    < end of copied text >

## 2022-10-04 NOTE — PROGRESS NOTE ADULT - ASSESSMENT
ASSESSMENT/PLAN: 75 yo female spont L ICH  L ICH M- likely secondary to amyloid vs HTN  Resp failure  Brain compression   HTN Urgency  Hypokalemic   Coma       NEURO:  Coma checks  Case discussed with Neurosurgery and deemed not surgery candidate based on mRS and GCS  GOC with  Ron - Cell - [phone], Home - [phone] - expressed her lack of independence at home requiring ADL's by him who makes all medical decisions.    High risk for EVD- sig brain shift and no chance of viable outcome with improvement  S/P 70 grams Mannitol on admission   Maintain Na - 140-- 145 - secondary to shift and matteo hematoma edema  Keppra 500mg q 12  Teagan - On   Palliative Care consultation   Activity:  [X] Bedrest      PULM: Resp failure secondary to neurologic injury - intubation / Resp alkalosis  Capnogram   Goal CO2 - 35- 38   Maintain  O2 sats > 92 %  CXR- R Middle lobe infiltrate   ETT - pull back 1 cm      CV: HTN urgency  Cardene at 60 cc/hr   EKG - Nl sinus rhythm  ECho  TTE Echo Complete w/ Contrast w/ Doppler (10.03.22 @ 12:33) >  Summary:   1. Technically difficult study.   2. Left ventricular ejection fraction, by visual estimation, is >75%.   3. Hyperdynamic global left ventricular systolic function.   4. Mildly increased LV wall thickness.   5. Normal right ventricular size and function, inadequate estimation of   RVSP.   6. Trace mitral valve regurgitation.   7. Mild mitral annular calcification.   8. Small-to-moderate size pericardial effusion, largest loculated region   measures 1.4 cm along the RV apex with apical wall systolic   invagaination, fibrinous layering also noted. No obvious   echocardiographic evidence of tamponade.   9. Incidental finding of a large cystic structure in the liver,   measuring 7.30cm x 8.45cm.  Maintain Nl electrolytes  Troponin- < .01  SBP goal- SBp 130- < 160    RENAL:  S/P Hypokalemia , uremia  Fluids- 500cc X1  Check electrolytes ; suppl K   monitor urine output  DI watch- If Urine out > 250 cc/hr for 3 consective hrs- sNa ; urine osmolality \      GI: Large Liver cyst   Hold Chest abd pelvis contrast CT for now   Jevity- 55cc/hr   GI prophylaxis  [X ] other: Pepcid 20mg q12  Bowel regimen  [X] senna     ENDO:   HGBA1C -0 5.5  TSH- 0.18/ Free T4   LDL- nl   Trig - as above   Goal euglycemia (-180)    HEME/ONC: Mild leucocytosis likely stress related   VTE prophylaxis: x[] SCDs [X] chemoprophylaxis- Bleed likely secondary to amyloid hold AC   UA - neg ; CXR - no infiltrate;   Doppler LE - neg    ID: COVID Pos - Asymptomatic  RLL infiltrate  Blood   Sputum - Cefepime 2 gram q12   MRSA - neg   UA - neg  Afebrile      LUE - IV iniltrated     SOCIAL/FAMILY:  [X] updated at bedside- Ron Beck     CODE STATUS:   [X] DNR I [X] Palliative/Comfort Care    DISPOSITION:  [X] ICU    [X] Patient is at high risk of neurologic deterioration/death due to:  brain swelling ; herniation and bleeding    Time spent:50 critical care minutes ASSESSMENT/PLAN: 77 yo female spont L ICH  L ICH M- likely secondary to amyloid vs HTN  Resp failure  Brain compression   HTN Urgency  Hypokalemic   Coma       NEURO:  Coma checks  CT brain   Case discussed with Neurosurgery and deemed not surgery candidate based on mRS and GCS  GOC with  Ron - Cell - [phone], Home - [phone] - expressed her lack of independence at home requiring ADL's by him who makes all medical decisions.    High risk for EVD- sig brain shift and no chance of viable outcome with improvement  S/P 70 grams Mannitol on admission   Maintain Na - 140-- 145 - secondary to shift and matteo hematoma edema  Keppra 500mg q 12  Teagan - On   Palliative Care consultation - step daughter can be involved in discussion   Activity:  [X] Bedrest      PULM: Resp failure secondary to neurologic injury - intubation / Resp alkalosis  Capnogram   CXR  Goal CO2 - 35- 38   Maintain  O2 sats > 92 %  CXR- R Middle lobe infiltrate   ETT - pull back 1 cm      CV: HTN urgency  Cardene at 60 cc/hr   EKG - Nl sinus rhythm  ECho  TTE Echo Complete w/ Contrast w/ Doppler (10.03.22 @ 12:33) >  Summary:   1. Technically difficult study.   2. Left ventricular ejection fraction, by visual estimation, is >75%.   3. Hyperdynamic global left ventricular systolic function.   4. Mildly increased LV wall thickness.   5. Normal right ventricular size and function, inadequate estimation of   RVSP.   6. Trace mitral valve regurgitation.   7. Mild mitral annular calcification.   8. Small-to-moderate size pericardial effusion, largest loculated region   measures 1.4 cm along the RV apex with apical wall systolic   invagaination, fibrinous layering also noted. No obvious   echocardiographic evidence of tamponade.   9. Incidental finding of a large cystic structure in the liver,   measuring 7.30cm x 8.45cm.  Maintain Nl electrolytes  Troponin- < .01  SBP goal- SBp 130- < 160    RENAL:  S/P Hypokalemia , uremia  Fluids- 500cc X1  Check electrolytes ; suppl K   monitor urine output  DI watch- If Urine out > 250 cc/hr for 3 consective hrs- sNa ; urine osmolality \      GI: Large Liver cyst   Hold Chest abd pelvis contrast CT for now   Jevity- 55cc/hr   GI prophylaxis  [X ] other: Pepcid 20mg q12  Bowel regimen  [X] senna     ENDO:   HGBA1C -0 5.5  TSH- 0.18/ Free T4   LDL- nl   Trig - as above   Goal euglycemia (-180)    HEME/ONC: Mild leucocytosis likely stress related   VTE prophylaxis: x[] SCDs [X] chemoprophylaxis- Bleed likely secondary to amyloid hold AC   UA - neg ; CXR - no infiltrate;   Doppler LE - neg    ID: COVID Pos - Asymptomatic  RLL infiltrate- RLL infiltrate   Lactate- 1.20  Blood - P  Sputum    Cefepime 2 gram q12- Day 2/7 ABX    MRSA Nasal PCR - neg   UA - neg  Afebrile      LUE - IV iniltrated     SOCIAL/FAMILY:  [X] updated at bedside- Ron Beck     CODE STATUS:   [X] DNR I [X] Palliative/Comfort Care    DISPOSITION:  [X] ICU    [X] Patient is at high risk of neurologic deterioration/death due to:  brain swelling ; herniation and bleeding    Time spent:50 critical care minutes

## 2022-10-04 NOTE — PROGRESS NOTE ADULT - ASSESSMENT
Assessment:  76F with PMH HTN, HLD, DM who was found unresponsive on floor by spouse, found to have L ICH, required intubation for airway protection.   - PBD2  - Exam remains stable      Plan:  - Q2 hour neuro checks  - SBP goal 110-160  - AED: keppra 500 BID  - Monitor UOP, DI watch  - Palliative care following, made DNR  - DVT prophylaxis: SCDs only 2/2 acute bleed, LEDs negative   - Last BM 10/3, on bowel regimen   - Further care per NSICU   - Final plan pending morning rounds with attending  - No further neurosurgical needs, please re-call PRN

## 2022-10-04 NOTE — DIETITIAN INITIAL EVALUATION ADULT - OTHER INFO
76 year old female with PMH HTN, HLD who was found unresponsive on floor by spouse, found to have L ICH, required intubation for airway protection.    Pt remains intubated/sedated at this time. No family present at bedside to obtain nutrition hx. Jevity 1.5 ordered for goal rate of 55 ml/hr (x20 hrs) which provides 1100 ml, 1650 kcal, 70g protein, 836 ml free water, and >100% of RDIs for vitamins/minerals.

## 2022-10-04 NOTE — DIETITIAN INITIAL EVALUATION ADULT - PERTINENT LABORATORY DATA
10-04    149<H>  |  115<H>  |  32.4<H>  ----------------------------<  179<H>  3.6   |  19.0<L>  |  1.35<H>    Ca    9.9      04 Oct 2022 04:00  Phos  3.0     10-04  Mg     2.1     10-04    TPro  7.4  /  Alb  3.4  /  TBili  0.8  /  DBili  x   /  AST  19  /  ALT  9   /  AlkPhos  95  10-03  A1C with Estimated Average Glucose Result: 5.5 % (10-02-22 @ 13:00)

## 2022-10-04 NOTE — DIETITIAN INITIAL EVALUATION ADULT - NSFNSGIIOFT_GEN_A_CORE
10-03-22 @ 07:01  -  10-04-22 @ 07:00  --------------------------------------------------------  OUT:    Nasogastric/Oral tube (mL): 450 mL  Total OUT: 450 mL    Total NET: 385 mL      10-04-22 @ 07:01  -  10-04-22 @ 09:23  --------------------------------------------------------  OUT:  Total OUT: 0 mL    Total NET: 55 mL

## 2022-10-04 NOTE — DIETITIAN INITIAL EVALUATION ADULT - PERTINENT MEDS FT
MEDICATIONS  (STANDING):  chlorhexidine 0.12% Liquid 15 milliLiter(s) Oral Mucosa every 12 hours  chlorhexidine 2% Cloths 1 Application(s) Topical daily  dexMEDEtomidine Infusion 0.2 MICROgram(s)/kG/Hr (3.32 mL/Hr) IV Continuous <Continuous>  famotidine Injectable 20 milliGRAM(s) IV Push daily  latanoprost 0.005% Ophthalmic Solution 1 Drop(s) Both EYES at bedtime  levETIRAcetam 500 milliGRAM(s) Oral two times a day  polyethylene glycol 3350 17 Gram(s) Oral daily  potassium chloride   Powder 40 milliEquivalent(s) Enteral Tube once  senna 1 Tablet(s) Oral at bedtime    MEDICATIONS  (PRN):  hydrALAZINE Injectable 10 milliGRAM(s) IV Push every 2 hours PRN SBP >160  labetalol Injectable 10 milliGRAM(s) IV Push every 2 hours PRN SBP >160

## 2022-10-04 NOTE — PROGRESS NOTE ADULT - SUBJECTIVE AND OBJECTIVE BOX
SUMMARY:HPI:  Pt is a 75 yo F brought in by EMS for unresponsiveness.   He called EMS.  Last known well was 11 PM on 10/1/22.  At 515 this morning  heard her yelling and found her on the floor either on the way to or from the bathroom.  Pt unable to provide hx.    ADMISSION SCORES:   ICH score - 5;  Baseline mRS- 4  GCS - 3 T  Allergies    latex (Hives)  Levaquin (Swelling; Rash)  penicillin (Swelling; Rash)        REVIEW OF SYSTEMS: [x ] Unable to Assess due to neurologic exam    Neuro: [ ] Headache [ ] Back pain [ ] Numbness [ ] Weakness [ ] Ataxia [ ] Dizziness [ ] Aphasia [ ] Dysarthria [ ] Visual disturbance  Resp: [ ] Shortness of breath/dyspnea, [ ] Orthopnea [ ] Cough  CV: [ ] Chest pain [ ] Palpitation [ ] Lightheadedness [ ] Syncope  Renal: [ ] Thirst [ ] Edema  GI: [ ] Nausea [ ] Emesis [ ] Abdominal pain [ ] Constipation [ ] Diarrhea  Hem: [ ] Hematemesis [ ] bright red blood per rectum  ID: [ ] Fever [ ] Chills [ ] Dysuria  ENT: [ ] Rhinorrhea    DEVICES:    [x ] ET tube [ X] anderson       ICU Vital Signs Last 24 Hrs  T(C): 37.5 (04 Oct 2022 11:00), Max: 37.8 (03 Oct 2022 12:00)  T(F): 99.5 (04 Oct 2022 11:00), Max: 100 (03 Oct 2022 12:00)  HR: 80 (04 Oct 2022 11:21) (65 - 108)  BP: 147/99 (04 Oct 2022 11:00) (95/57 - 180/85)  BP(mean): 113 (04 Oct 2022 11:00) (70 - 120)  RR: 21 (04 Oct 2022 11:00) (16 - 46)  SpO2: 100% (04 Oct 2022 11:21) (96% - 100%)    O2 Parameters below as of 04 Oct 2022 10:00        Mode: AC/ CMV (Assist Control/ Continuous Mandatory Ventilation)  RR (machine): 24  TV (machine): 450  FiO2: 50  PEEP: 5  MAP: 8  PIP: 24      03 Oct 2022 07:01  -  04 Oct 2022 07:00  --------------------------------------------------------  IN:    Dexmedetomidine: 44.9 mL    IV PiggyBack: 300 mL    IV PiggyBack: 250 mL    Jevity 1.5: 835 mL    NiCARdipine: 537.5 mL    Sodium Chloride 0.9% Bolus: 500 mL  Total IN: 2467.4 mL    OUT:    Indwelling Catheter - Urethral (mL): 745 mL    Nasogastric/Oral tube (mL): 450 mL  Total OUT: 1195 mL    Total NET: 1272.4 mL      04 Oct 2022 07:01  -  04 Oct 2022 11:54  --------------------------------------------------------  IN:    Dexmedetomidine: 18.7 mL    Jevity 1.5: 220 mL  Total IN: 238.7 mL    OUT:    Indwelling Catheter - Urethral (mL): 120 mL  Total OUT: 120 mL    Total NET: 118.7 mL      MEDICATIONS  (STANDING):  chlorhexidine 0.12% Liquid 15 milliLiter(s) Oral Mucosa every 12 hours  chlorhexidine 2% Cloths 1 Application(s) Topical daily  dexMEDEtomidine Infusion 0.2 MICROgram(s)/kG/Hr (3.32 mL/Hr) IV Continuous <Continuous>  famotidine Injectable 20 milliGRAM(s) IV Push daily  latanoprost 0.005% Ophthalmic Solution 1 Drop(s) Both EYES at bedtime  levETIRAcetam 500 milliGRAM(s) Oral two times a day  polyethylene glycol 3350 17 Gram(s) Oral daily  senna 1 Tablet(s) Oral at bedtime    MEDICATIONS  (PRN):  hydrALAZINE Injectable 10 milliGRAM(s) IV Push every 2 hours PRN SBP >160  labetalol Injectable 10 milliGRAM(s) IV Push every 2 hours PRN SBP >160      LABS:  PT/INR - ( 02 Oct 2022 07:10 )   PT: 12.6 sec;   INR: 1.09 ratio    PTT - ( 02 Oct 2022 07:10 )  PTT:30.9 sec    10-04    149<H>  |  115<H>  |  32.4<H>  ----------------------------<  179<H>  3.6   |  19.0<L>  |  1.35<H>    Ca    9.9      04 Oct 2022 04:00  Phos  3.0     10-04  Mg     2.1     10-04    TPro  7.4  /  Alb  3.4  /  TBili  0.8  /  DBili  x   /  AST  19  /  ALT  9   /  AlkPhos  95  10-03      10-02    142  |  106  |  11.7  ----------------------------<  194<H>  3.3<L>   |  24.0  |  0.96    Ca    9.8      02 Oct 2022 07:10    TPro  7.1  /  Alb  3.6  /  TBili  0.3<L>  /  DBili  x   /  AST  18  /  ALT  9   /  AlkPhos  90  10-02                          11.6   22.25 )-----------( 289      ( 04 Oct 2022 04:00 )             35.9                             12.5   13.16 )-----------( 342      ( 02 Oct 2022 07:10 )             38.3       STROKE CORE MEASURES:   HGBA1C- 5.5  LDL- 70  Trig- 68  TSH - 0.2 / 8.4      IMAGING/DATA: [ X] Reviewed     US Duplex Venous Lower Ext Complete, Bilateral (10.03.22 @ 16:41) >  IMPRESSION:  No evidence of deep venous thrombosis in either lower extremity.         Xray Chest 1 View- PORTABLE-Urgent (Xray Chest 1 View- PORTABLE-Urgent .) (10.03.22 @ 11:23) >    IMPRESSION: Developing bilateral infiltrates. Somewhat low endotracheal   tube. Consider some withdrawal.             CT Brain Stroke Protocol (10.02.22 @ 07:28) >    IMPRESSION:    Large parenchymal hemorrhage centered on the right basal ganglia and   thalamus with intraventricular extension, 1.6 cm of rightward midline   shift, and hydrocephalus.    Prominence of the pituitary gland with superior convexity. Consider   nonemergent follow-up MRI with pituitary protocol to evaluate for   underlying lesion, as clinically warranted, if no contraindications exist.    EKG - Nl sinus rhythmn      EXAMINATION:  PHYSICAL EXAM:    Constitutional: No Acute Distress     Neurological: Intubated , no need for sedation   Pupils 5mm L 5mm R and 3 mm R R , No dolls , No corneal , Pos gag or cough, No grimacing to noxious stimuli , no spont movement    Motor exam:          Upper extremity                         Delt     Bicep     Tricep    HG                                                 R       R decorticate                                                L       L decrebrate           Lower extremity                        HF         KF        KE       DF         PF                                                  R       Flaccid                                               L        Flaccid                                                 Sensation:Not intact     Pulmonary: Clear to Auscultation,    Cardiovascular: S1, S2, Regular rate and rhythm     Gastrointestinal: Soft, Non-tender, Non-distended           SUMMARY:HPI:  Pt is a 75 yo F brought in by EMS for unresponsiveness.   He called EMS.  Last known well was 11 PM on 10/1/22.  At 515 this morning  heard her yelling and found her on the floor either on the way to or from the bathroom.  Pt unable to provide hx.    ADMISSION SCORES:   ICH score - 5;  Baseline mRS- 4  GCS - 3 T  Allergies    latex (Hives)  Levaquin (Swelling; Rash)  penicillin (Swelling; Rash)      Overnight -= HTN        REVIEW OF SYSTEMS: [x ] Unable to Assess due to neurologic exam    Neuro: [ ] Headache [ ] Back pain [ ] Numbness [ ] Weakness [ ] Ataxia [ ] Dizziness [ ] Aphasia [ ] Dysarthria [ ] Visual disturbance  Resp: [ ] Shortness of breath/dyspnea, [ ] Orthopnea [ ] Cough  CV: [ ] Chest pain [ ] Palpitation [ ] Lightheadedness [ ] Syncope  Renal: [ ] Thirst [ ] Edema  GI: [ ] Nausea [ ] Emesis [ ] Abdominal pain [ ] Constipation [ ] Diarrhea  Hem: [ ] Hematemesis [ ] bright red blood per rectum  ID: [ ] Fever [ ] Chills [ ] Dysuria  ENT: [ ] Rhinorrhea    DEVICES:    [x ] ET tube [ X] anderson       ICU Vital Signs Last 24 Hrs  T(C): 36.6 (05 Oct 2022 11:30), Max: 38 (04 Oct 2022 20:00)  T(F): 97.9 (05 Oct 2022 11:30), Max: 100.4 (04 Oct 2022 20:00)  HR: 77 (05 Oct 2022 11:30) (64 - 91)  BP: 160/64 (05 Oct 2022 11:30) (116/68 - 176/88)  BP(mean): 94 (05 Oct 2022 11:30) (82 - 129)  RR: 22 (05 Oct 2022 11:30) (15 - 32)  SpO2: 100% (05 Oct 2022 11:30) (96% - 100%)    O2 Parameters below as of 05 Oct 2022 08:00  Patient On (Oxygen Delivery Method): ventilator    Mode: AC/ CMV (Assist Control/ Continuous Mandatory Ventilation)  RR (machine): 24  TV (machine): 450  FiO2: 50  PEEP: 5  MAP: 8  PIP: 24      04 Oct 2022 07:01  -  05 Oct 2022 07:00  --------------------------------------------------------  IN:    Dexmedetomidine: 18.7 mL    Dexmedetomidine: 68 mL    Enteral Tube Flush: 350 mL    Free Water: 600 mL    Jevity 1.5: 1320 mL    Sodium Chloride 0.9% Bolus: 500 mL  Total IN: 2856.7 mL    OUT:    Indwelling Catheter - Urethral (mL): 1087 mL  Total OUT: 1087 mL    Total NET: 1769.7 mL      05 Oct 2022 07:01  -  05 Oct 2022 11:53  --------------------------------------------------------  IN:    Dexmedetomidine: 13.6 mL    Jevity 1.5: 220 mL  Total IN: 233.6 mL    OUT:    Indwelling Catheter - Urethral (mL): 280 mL  Total OUT: 280 mL    Total NET: -46.4 mL            03 Oct 2022 07:01  -  04 Oct 2022 07:00  --------------------------------------------------------  IN:    Dexmedetomidine: 44.9 mL    IV PiggyBack: 300 mL    IV PiggyBack: 250 mL    Jevity 1.5: 835 mL    NiCARdipine: 537.5 mL    Sodium Chloride 0.9% Bolus: 500 mL  Total IN: 2467.4 mL    OUT:    Indwelling Catheter - Urethral (mL): 745 mL    Nasogastric/Oral tube (mL): 450 mL  Total OUT: 1195 mL    Total NET: 1272.4 mL      04 Oct 2022 07:01  -  04 Oct 2022 11:54  --------------------------------------------------------  IN:    Dexmedetomidine: 18.7 mL    Jevity 1.5: 220 mL  Total IN: 238.7 mL    OUT:    Indwelling Catheter - Urethral (mL): 120 mL  Total OUT: 120 mL    Total NET: 118.7 mL      MEDICATIONS  (STANDING):  cefepime  Injectable.      cefepime  Injectable. 2000 milliGRAM(s) IV Push every 12 hours  chlorhexidine 0.12% Liquid 15 milliLiter(s) Oral Mucosa every 12 hours  chlorhexidine 2% Cloths 1 Application(s) Topical daily  dexMEDEtomidine Infusion 0.2 MICROgram(s)/kG/Hr (3.32 mL/Hr) IV Continuous <Continuous>  diltiazem    Tablet 60 milliGRAM(s) Oral every 8 hours  famotidine Injectable 20 milliGRAM(s) IV Push daily  latanoprost 0.005% Ophthalmic Solution 1 Drop(s) Both EYES at bedtime  levETIRAcetam 500 milliGRAM(s) Oral two times a day  lisinopril 10 milliGRAM(s) Oral daily  polyethylene glycol 3350 17 Gram(s) Oral daily  senna 1 Tablet(s) Oral at bedtime    MEDICATIONS  (PRN):  acetaminophen    Suspension .. 650 milliGRAM(s) Enteral Tube every 6 hours PRN Temp greater or equal to 38C (100.4F)  hydrALAZINE Injectable 10 milliGRAM(s) IV Push every 2 hours PRN SBP >160  labetalol Injectable 10 milliGRAM(s) IV Push every 2 hours PRN SBP >160        LABS:      PT/INR - ( 02 Oct 2022 07:10 )   PT: 12.6 sec;   INR: 1.09 ratio    PTT - ( 02 Oct 2022 07:10 )  PTT:30.9 sec    10-05    151<H>  |  119<H>  |  28.9<H>  ----------------------------<  144<H>  4.1   |  20.0<L>  |  1.01    Ca    9.5      05 Oct 2022 03:40  Phos  2.3     10-05  Mg     2.5     10-05        10-04    149<H>  |  115<H>  |  32.4<H>  ----------------------------<  179<H>  3.6   |  19.0<L>  |  1.35<H>    Ca    9.9      04 Oct 2022 04:00  Phos  3.0     10-04  Mg     2.1     10-04    TPro  7.4  /  Alb  3.4  /  TBili  0.8  /  DBili  x   /  AST  19  /  ALT  9   /  AlkPhos  95  10-03      10-02    142  |  106  |  11.7  ----------------------------<  194<H>  3.3<L>   |  24.0  |  0.96    Ca    9.8      02 Oct 2022 07:10    TPro  7.1  /  Alb  3.6  /  TBili  0.3<L>  /  DBili  x   /  AST  18  /  ALT  9   /  AlkPhos  90  1002                          10.4   18.80 )-----------( 231      ( 05 Oct 2022 03:40 )             32.9                             11.6   22.25 )-----------( 289      ( 04 Oct 2022 04:00 )             35.9                             12.5   13.16 )-----------( 342      ( 02 Oct 2022 07:10 )             38.3       STROKE CORE MEASURES:   HGBA1C- 5.5  LDL- 70  Trig- 68  TSH - 0.2 / 8.4      IMAGING/DATA: [ X] Reviewed      IMPRESSION:  This is an abnormal 21-minute EEG in comatose patient showin. A focal structural abnormality and/or focal coritcal dysfunction over the left hemisphere.   2. 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       US Duplex Venous Lower Ext Complete, Bilateral (10.03.22 @ 16:41) >    IMPRESSION:  No evidence of deep venous thrombosis in either lower extremity.    < end of copied text >       US Duplex Venous Lower Ext Complete, Bilateral (10.03.22 @ 16:41) >  IMPRESSION:  No evidence of deep venous thrombosis in either lower extremity.         Xray Chest 1 View- PORTABLE-Urgent (Xray Chest 1 View- PORTABLE-Urgent .) (10.03.22 @ 11:23) >    IMPRESSION: Developing bilateral infiltrates. Somewhat low endotracheal   tube. Consider some withdrawal.             CT Brain Stroke Protocol (10.02.22 @ 07:28) >    IMPRESSION:    Large parenchymal hemorrhage centered on the right basal ganglia and   thalamus with intraventricular extension, 1.6 cm of rightward midline   shift, and hydrocephalus.    Prominence of the pituitary gland with superior convexity. Consider   nonemergent follow-up MRI with pituitary protocol to evaluate for   underlying lesion, as clinically warranted, if no contraindications exist.    EKG - Nl sinus rhythmn      EXAMINATION:  PHYSICAL EXAM:    Constitutional: No Acute Distress     Neurological: Intubated , no need for sedation   Pupils 5mm L 5mm R and 3 mm R R , No dolls , No corneal , Pos gag or cough, No grimacing to noxious stimuli , no spont movement    Motor exam:          Upper extremity                         Delt     Bicep     Tricep    HG                                                 R       R decorticate                                                L       L decrebrate           Lower extremity                        HF         KF        KE       DF         PF                                                  R       Flaccid                                               L        Flaccid                                                 Sensation:Not intact     Pulmonary: Clear to Auscultation,    Cardiovascular: S1, S2, Regular rate and rhythm     Gastrointestinal: Soft, Non-tender, Non-distended

## 2022-10-04 NOTE — PROGRESS NOTE ADULT - SUBJECTIVE AND OBJECTIVE BOX
CC:  Follow up GOC , Symptoms    OVERNIGHT EVENTS:  discussed with NSICU - patient with poor prognosis  discussed with nurse- on minimal sedation, poor neuro status    Present Symptoms:   Dyspnea:  No on vent  Nausea/Vomiting:  No   Anxiety:  no  Depression:   Unable  Fatigue:  Yes    Loss of appetite:    NA   Constipation: Not Reported       Pain:   No Signs            Character-            Duration-            Location-            Severity-    Pain AD Score:  [email or web address] (press ctrl + left click to view)    Review of Systems: Reviewed as above  Further ROS unable to  obtain due to poor mentation       MEDICATIONS  (STANDING):  cefepime  Injectable.      chlorhexidine 0.12% Liquid 15 milliLiter(s) Oral Mucosa every 12 hours  chlorhexidine 2% Cloths 1 Application(s) Topical daily  dexMEDEtomidine Infusion 0.2 MICROgram(s)/kG/Hr (3.32 mL/Hr) IV Continuous <Continuous>  famotidine Injectable 20 milliGRAM(s) IV Push daily  latanoprost 0.005% Ophthalmic Solution 1 Drop(s) Both EYES at bedtime  levETIRAcetam 500 milliGRAM(s) Oral two times a day  polyethylene glycol 3350 17 Gram(s) Oral daily  senna 1 Tablet(s) Oral at bedtime    MEDICATIONS  (PRN):  hydrALAZINE Injectable 10 milliGRAM(s) IV Push every 2 hours PRN SBP >160  labetalol Injectable 10 milliGRAM(s) IV Push every 2 hours PRN SBP >160      PHYSICAL EXAM:    Vital Signs Last 24 Hrs  T(C): 37.5 (04 Oct 2022 12:30), Max: 37.8 (03 Oct 2022 14:15)  T(F): 99.5 (04 Oct 2022 12:30), Max: 100 (03 Oct 2022 14:15)  HR: 83 (04 Oct 2022 12:30) (65 - 108)  BP: 152/100 (04 Oct 2022 12:30) (95/57 - 180/85)  BP(mean): 113 (04 Oct 2022 12:30) (70 - 120)  RR: 18 (04 Oct 2022 12:30) (16 - 46)  SpO2: 100% (04 Oct 2022 12:30) (96% - 100%)    Parameters below as of 04 Oct 2022 10:00      O2 Concentration (%): 30    Karnofsky:  10%  General:   F NAD intubated     HEENT:  NCAT     (x  )  ET tube     Lungs: comfortable  non labored  CV:  RR  GI:  soft NTND  : justin               MSK: bedbound  Skin:  warm/dry  Neuro  non responsive    LABS:                          11.6   22.25 )-----------( 289      ( 04 Oct 2022 04:00 )             35.9     10-04    149<H>  |  115<H>  |  32.4<H>  ----------------------------<  179<H>  3.6   |  19.0<L>  |  1.35<H>    Ca    9.9      04 Oct 2022 04:00  Phos  3.0     10-04  Mg     2.1     10-04    TPro  7.4  /  Alb  3.4  /  TBili  0.8  /  DBili  x   /  AST  19  /  ALT  9   /  AlkPhos  95  10-03      Urinalysis Basic - ( 03 Oct 2022 04:00 )    Color: Yellow / Appearance: Clear / S.005 / pH: x  Gluc: x / Ketone: Negative  / Bili: Negative / Urobili: Negative mg/dL   Blood: x / Protein: Negative / Nitrite: Negative   Leuk Esterase: Negative / RBC: 0-2 /HPF / WBC 0-2 /HPF   Sq Epi: x / Non Sq Epi: Occasional / Bacteria: Occasional      I&O's Summary    03 Oct 2022 07:01  -  04 Oct 2022 07:00  --------------------------------------------------------  IN: 2467.4 mL / OUT: 1195 mL / NET: 1272.4 mL    04 Oct 2022 07:01  -  04 Oct 2022 14:07  --------------------------------------------------------  IN: 297.1 mL / OUT: 192 mL / NET: 105.1 mL        RADIOLOGY & ADDITIONAL STUDIES:    < from: CT Brain Stroke Protocol (10.02.22 @ 07:28) >  ACC: [id number] EXAM:  CT BRAIN STROKE PROTOCOL                          PROCEDURE DATE:  10/02/2022          INTERPRETATION:  CLINICAL INFORMATION: Stroke code.    COMPARISON: CT head of 2014.    PROCEDURE:  Noncontrast CT of the Head was performed from the skull base to the   vertex. Coronal and Sagittal reformats were obtained.    FINDINGS:    7.1 x 4.4 x 4.7 cm (3:24 and 6:23) parenchymal hemorrhage centered on the   left basal ganglia, with additional hemorrhage within the left thalamus.   Intraventricular extension of hemorrhage predominantly involving the left   lateral, third, and fourth ventricles. Rightward midline shift of   approximately 1.6 cm with associated dilatation of the right lateral   ventricle. Mild patchy hypodensities within the periventricular and   subcortical white matter, although nonspecific, likely reflect chronic   microvascular disease. Nonspecific prominence of the pituitary gland,   with superior convexity, which appears slightly more prominent since   prior examination 2014.    Ostiomeatal unit pattern of obstruction involving the left frontal,   anterior ethmoid, and maxillary sinuses as well as additional right   maxillary sinus mucosal thickening. Mastoid air cells are clear.   Intraorbital contents are unremarkable. Calvarium is intact.    IMPRESSION:    Large parenchymal hemorrhage centered on the right basal ganglia and   thalamus with intraventricular extension, 1.6 cm of rightward midline   shift, and hydrocephalus.    Prominence of the pituitary gland with superior convexity. Consider   nonemergent follow-up MRI with pituitary protocol to evaluate for   underlying lesion, as clinically warranted, if no contraindications exist.    Dr. Aleman discussed the above findings withDr. Choi at 7:27 AM on   10/2/2022 with read back.      < end of copied text >      ADVANCE DIRECTIVES/TREATMENT PREFERENCES:

## 2022-10-05 NOTE — PROGRESS NOTE ADULT - SUBJECTIVE AND OBJECTIVE BOX
SUMMARY:HPI:  Pt is a 75 yo F brought in by EMS for unresponsiveness.   He called EMS.  Last known well was 11 PM on 10/1/22.  At 515 this morning  heard her yelling and found her on the floor either on the way to or from the bathroom.  Pt unable to provide hx.    ADMISSION SCORES:   ICH score - 5;  Baseline mRS- 4  GCS - 3 T  Allergies    latex (Hives)  Levaquin (Swelling; Rash)  penicillin (Swelling; Rash)      Overnight -= HTN        REVIEW OF SYSTEMS: [x ] Unable to Assess due to neurologic exam    Neuro: [ ] Headache [ ] Back pain [ ] Numbness [ ] Weakness [ ] Ataxia [ ] Dizziness [ ] Aphasia [ ] Dysarthria [ ] Visual disturbance  Resp: [ ] Shortness of breath/dyspnea, [ ] Orthopnea [ ] Cough  CV: [ ] Chest pain [ ] Palpitation [ ] Lightheadedness [ ] Syncope  Renal: [ ] Thirst [ ] Edema  GI: [ ] Nausea [ ] Emesis [ ] Abdominal pain [ ] Constipation [ ] Diarrhea  Hem: [ ] Hematemesis [ ] bright red blood per rectum  ID: [ ] Fever [ ] Chills [ ] Dysuria  ENT: [ ] Rhinorrhea    DEVICES:    [x ] ET tube [ X] anderson       ICU Vital Signs Last 24 Hrs  T(C): 36.6 (05 Oct 2022 11:30), Max: 38 (04 Oct 2022 20:00)  T(F): 97.9 (05 Oct 2022 11:30), Max: 100.4 (04 Oct 2022 20:00)  HR: 77 (05 Oct 2022 11:30) (64 - 91)  BP: 160/64 (05 Oct 2022 11:30) (116/68 - 176/88)  BP(mean): 94 (05 Oct 2022 11:30) (82 - 129)  RR: 22 (05 Oct 2022 11:30) (15 - 32)  SpO2: 100% (05 Oct 2022 11:30) (96% - 100%)    O2 Parameters below as of 05 Oct 2022 08:00  Patient On (Oxygen Delivery Method): ventilator    Mode: AC/ CMV (Assist Control/ Continuous Mandatory Ventilation)  RR (machine): 24  TV (machine): 450  FiO2: 50  PEEP: 5  MAP: 8  PIP: 24      04 Oct 2022 07:01  -  05 Oct 2022 07:00  --------------------------------------------------------  IN:    Dexmedetomidine: 18.7 mL    Dexmedetomidine: 68 mL    Enteral Tube Flush: 350 mL    Free Water: 600 mL    Jevity 1.5: 1320 mL    Sodium Chloride 0.9% Bolus: 500 mL  Total IN: 2856.7 mL    OUT:    Indwelling Catheter - Urethral (mL): 1087 mL  Total OUT: 1087 mL    Total NET: 1769.7 mL      05 Oct 2022 07:01  -  05 Oct 2022 11:53  --------------------------------------------------------  IN:    Dexmedetomidine: 13.6 mL    Jevity 1.5: 220 mL  Total IN: 233.6 mL    OUT:    Indwelling Catheter - Urethral (mL): 280 mL  Total OUT: 280 mL    Total NET: -46.4 mL            03 Oct 2022 07:01  -  04 Oct 2022 07:00  --------------------------------------------------------  IN:    Dexmedetomidine: 44.9 mL    IV PiggyBack: 300 mL    IV PiggyBack: 250 mL    Jevity 1.5: 835 mL    NiCARdipine: 537.5 mL    Sodium Chloride 0.9% Bolus: 500 mL  Total IN: 2467.4 mL    OUT:    Indwelling Catheter - Urethral (mL): 745 mL    Nasogastric/Oral tube (mL): 450 mL  Total OUT: 1195 mL    Total NET: 1272.4 mL      04 Oct 2022 07:01  -  04 Oct 2022 11:54  --------------------------------------------------------  IN:    Dexmedetomidine: 18.7 mL    Jevity 1.5: 220 mL  Total IN: 238.7 mL    OUT:    Indwelling Catheter - Urethral (mL): 120 mL  Total OUT: 120 mL    Total NET: 118.7 mL      MEDICATIONS  (STANDING):  cefepime  Injectable.      cefepime  Injectable. 2000 milliGRAM(s) IV Push every 12 hours  chlorhexidine 0.12% Liquid 15 milliLiter(s) Oral Mucosa every 12 hours  chlorhexidine 2% Cloths 1 Application(s) Topical daily  dexMEDEtomidine Infusion 0.2 MICROgram(s)/kG/Hr (3.32 mL/Hr) IV Continuous <Continuous>  diltiazem    Tablet 60 milliGRAM(s) Oral every 8 hours  famotidine Injectable 20 milliGRAM(s) IV Push daily  latanoprost 0.005% Ophthalmic Solution 1 Drop(s) Both EYES at bedtime  levETIRAcetam 500 milliGRAM(s) Oral two times a day  lisinopril 10 milliGRAM(s) Oral daily  polyethylene glycol 3350 17 Gram(s) Oral daily  senna 1 Tablet(s) Oral at bedtime    MEDICATIONS  (PRN):  acetaminophen    Suspension .. 650 milliGRAM(s) Enteral Tube every 6 hours PRN Temp greater or equal to 38C (100.4F)  hydrALAZINE Injectable 10 milliGRAM(s) IV Push every 2 hours PRN SBP >160  labetalol Injectable 10 milliGRAM(s) IV Push every 2 hours PRN SBP >160        LABS:      PT/INR - ( 02 Oct 2022 07:10 )   PT: 12.6 sec;   INR: 1.09 ratio    PTT - ( 02 Oct 2022 07:10 )  PTT:30.9 sec    10-05    151<H>  |  119<H>  |  28.9<H>  ----------------------------<  144<H>  4.1   |  20.0<L>  |  1.01    Ca    9.5      05 Oct 2022 03:40  Phos  2.3     10-05  Mg     2.5     10-05        10-04    149<H>  |  115<H>  |  32.4<H>  ----------------------------<  179<H>  3.6   |  19.0<L>  |  1.35<H>    Ca    9.9      04 Oct 2022 04:00  Phos  3.0     10-04  Mg     2.1     10-04    TPro  7.4  /  Alb  3.4  /  TBili  0.8  /  DBili  x   /  AST  19  /  ALT  9   /  AlkPhos  95  10-03      10-02    142  |  106  |  11.7  ----------------------------<  194<H>  3.3<L>   |  24.0  |  0.96    Ca    9.8      02 Oct 2022 07:10    TPro  7.1  /  Alb  3.6  /  TBili  0.3<L>  /  DBili  x   /  AST  18  /  ALT  9   /  AlkPhos  90  1002                          10.4   18.80 )-----------( 231      ( 05 Oct 2022 03:40 )             32.9                             11.6   22.25 )-----------( 289      ( 04 Oct 2022 04:00 )             35.9                             12.5   13.16 )-----------( 342      ( 02 Oct 2022 07:10 )             38.3       STROKE CORE MEASURES:   HGBA1C- 5.5  LDL- 70  Trig- 68  TSH - 0.2 / 8.4      IMAGING/DATA: [ X] Reviewed      IMPRESSION:  This is an abnormal 21-minute EEG in comatose patient showin. A focal structural abnormality and/or focal coritcal dysfunction over the left hemisphere.   2. 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       US Duplex Venous Lower Ext Complete, Bilateral (10.03.22 @ 16:41) >    IMPRESSION:  No evidence of deep venous thrombosis in either lower extremity.    < end of copied text >       US Duplex Venous Lower Ext Complete, Bilateral (10.03.22 @ 16:41) >  IMPRESSION:  No evidence of deep venous thrombosis in either lower extremity.         Xray Chest 1 View- PORTABLE-Urgent (Xray Chest 1 View- PORTABLE-Urgent .) (10.03.22 @ 11:23) >    IMPRESSION: Developing bilateral infiltrates. Somewhat low endotracheal   tube. Consider some withdrawal.             CT Brain Stroke Protocol (10.02.22 @ 07:28) >    IMPRESSION:    Large parenchymal hemorrhage centered on the right basal ganglia and   thalamus with intraventricular extension, 1.6 cm of rightward midline   shift, and hydrocephalus.    Prominence of the pituitary gland with superior convexity. Consider   nonemergent follow-up MRI with pituitary protocol to evaluate for   underlying lesion, as clinically warranted, if no contraindications exist.    EKG - Nl sinus rhythmn      EXAMINATION:  PHYSICAL EXAM:    Constitutional: No Acute Distress     Neurological: Intubated , no need for sedation   Pupils 5mm L 5mm R and 3 mm R R , No dolls , No corneal , Pos gag or cough, No grimacing to noxious stimuli , no spont movement    Motor exam:          Upper extremity                         Delt     Bicep     Tricep    HG                                                 R       R decorticate                                                L       L decrebrate           Lower extremity                        HF         KF        KE       DF         PF                                                  R       Flaccid                                               L        Flaccid                                                 Sensation:Not intact     Pulmonary: Clear to Auscultation,    Cardiovascular: S1, S2, Regular rate and rhythm     Gastrointestinal: Soft, Non-tender, Non-distended           SUMMARY:HPI:  Pt is a 75 yo F brought in by EMS for unresponsiveness.   He called EMS.  Last known well was 11 PM on 10/1/22.  At 515 this morning  heard her yelling and found her on the floor either on the way to or from the bathroom.  Pt unable to provide hx.    ADMISSION SCORES:   ICH score - 5;  Baseline mRS- 4  GCS - 3 T  Allergies    latex (Hives)  Levaquin (Swelling; Rash)  penicillin (Swelling; Rash)      Overnight -= HTN        REVIEW OF SYSTEMS: [x ] Unable to Assess due to neurologic exam    Neuro: [ ] Headache [ ] Back pain [ ] Numbness [ ] Weakness [ ] Ataxia [ ] Dizziness [ ] Aphasia [ ] Dysarthria [ ] Visual disturbance  Resp: [ ] Shortness of breath/dyspnea, [ ] Orthopnea [ ] Cough  CV: [ ] Chest pain [ ] Palpitation [ ] Lightheadedness [ ] Syncope  Renal: [ ] Thirst [ ] Edema  GI: [ ] Nausea [ ] Emesis [ ] Abdominal pain [ ] Constipation [ ] Diarrhea  Hem: [ ] Hematemesis [ ] bright red blood per rectum  ID: [ ] Fever [ ] Chills [ ] Dysuria  ENT: [ ] Rhinorrhea    DEVICES:    [x ] ET tube [ X] anderson       ICU Vital Signs Last 24 Hrs  T(C): 37.3 (06 Oct 2022 08:00), Max: 37.7 (05 Oct 2022 23:00)  T(F): 99.1 (06 Oct 2022 08:00), Max: 99.9 (05 Oct 2022 23:00)  HR: 61 (06 Oct 2022 08:00) (61 - 83)  BP: 127/67 (06 Oct 2022 08:00) (115/61 - 187/93)  BP(mean): 83 (06 Oct 2022 08:00) (77 - 115)  RR: 19 (06 Oct 2022 08:00) (16 - 30)  SpO2: 100% (06 Oct 2022 08:00) (100% - 100%)    O2 Parameters below as of 06 Oct 2022 08:00  Patient On (Oxygen Delivery Method): ventilator    O2 Concentration (%): 30    Mode: AC/ CMV (Assist Control/ Continuous Mandatory Ventilation)  RR (machine): 24  TV (machine): 450  FiO2: 50  PEEP: 5  MAP: 8  PIP: 24      05 Oct 2022 07:01  -  06 Oct 2022 07:00  --------------------------------------------------------  IN:    Dexmedetomidine: 106.9 mL    Enteral Tube Flush: 300 mL    Free Water: 1000 mL    Jevity 1.5: 1320 mL  Total IN: 2726.9 mL    OUT:    Indwelling Catheter - Urethral (mL): 1190 mL  Total OUT: 1190 mL    Total NET: 1536.9 mL      06 Oct 2022 07:01  -  06 Oct 2022 08:54  --------------------------------------------------------  IN:    Dexmedetomidine: 6.6 mL    Jevity 1.5: 110 mL  Total IN: 116.6 mL    OUT:    Indwelling Catheter - Urethral (mL): 30 mL  Total OUT: 30 mL    Total NET: 86.6 mL            04 Oct 2022 07:01  -  05 Oct 2022 07:00  --------------------------------------------------------  IN:    Dexmedetomidine: 18.7 mL    Dexmedetomidine: 68 mL    Enteral Tube Flush: 350 mL    Free Water: 600 mL    Jevity 1.5: 1320 mL    Sodium Chloride 0.9% Bolus: 500 mL  Total IN: 2856.7 mL    OUT:    Indwelling Catheter - Urethral (mL): 1087 mL  Total OUT: 1087 mL    Total NET: 1769.7 mL      05 Oct 2022 07:01  -  05 Oct 2022 11:53  --------------------------------------------------------  IN:    Dexmedetomidine: 13.6 mL    Jevity 1.5: 220 mL  Total IN: 233.6 mL    OUT:    Indwelling Catheter - Urethral (mL): 280 mL  Total OUT: 280 mL    Total NET: -46.4 mL            03 Oct 2022 07:01  -  04 Oct 2022 07:00  --------------------------------------------------------  IN:    Dexmedetomidine: 44.9 mL    IV PiggyBack: 300 mL    IV PiggyBack: 250 mL    Jevity 1.5: 835 mL    NiCARdipine: 537.5 mL    Sodium Chloride 0.9% Bolus: 500 mL  Total IN: 2467.4 mL    OUT:    Indwelling Catheter - Urethral (mL): 745 mL    Nasogastric/Oral tube (mL): 450 mL  Total OUT: 1195 mL    Total NET: 1272.4 mL      04 Oct 2022 07:01  -  04 Oct 2022 11:54  --------------------------------------------------------  IN:    Dexmedetomidine: 18.7 mL    Jevity 1.5: 220 mL  Total IN: 238.7 mL    OUT:    Indwelling Catheter - Urethral (mL): 120 mL  Total OUT: 120 mL    Total NET: 118.7 mL    MEDICATIONS  (STANDING):  cefepime  Injectable.      cefepime  Injectable. 2000 milliGRAM(s) IV Push every 12 hours  chlorhexidine 0.12% Liquid 15 milliLiter(s) Oral Mucosa every 12 hours  chlorhexidine 2% Cloths 1 Application(s) Topical daily  dexMEDEtomidine Infusion 0.2 MICROgram(s)/kG/Hr (3.32 mL/Hr) IV Continuous <Continuous>  diltiazem    Tablet 60 milliGRAM(s) Oral every 8 hours  famotidine Injectable 20 milliGRAM(s) IV Push daily  hydrALAZINE 50 milliGRAM(s) Oral every 8 hours  latanoprost 0.005% Ophthalmic Solution 1 Drop(s) Both EYES at bedtime  levETIRAcetam 500 milliGRAM(s) Oral two times a day  lisinopril 10 milliGRAM(s) Oral daily  polyethylene glycol 3350 17 Gram(s) Oral daily  senna 1 Tablet(s) Oral at bedtime    MEDICATIONS  (PRN):  acetaminophen    Suspension .. 650 milliGRAM(s) Enteral Tube every 6 hours PRN Temp greater or equal to 38C (100.4F)  hydrALAZINE Injectable 10 milliGRAM(s) IV Push every 2 hours PRN SBP >160  labetalol Injectable 10 milliGRAM(s) IV Push every 2 hours PRN SBP >160          LABS:    10-06    150<H>  |  118<H>  |  30.3<H>  ----------------------------<  156<H>  4.5   |  22.0  |  1.01    Ca    9.3      06 Oct 2022 04:30  Phos  2.5     10-06  Mg     2.7     10-06          PT/INR - ( 02 Oct 2022 07:10 )   PT: 12.6 sec;   INR: 1.09 ratio    PTT - ( 02 Oct 2022 07:10 )  PTT:30.9 sec    10-05    151<H>  |  119<H>  |  28.9<H>  ----------------------------<  144<H>  4.1   |  20.0<L>  |  1.01    Ca    9.5      05 Oct 2022 03:40  Phos  2.3     10-05  Mg     2.5     10-05        10-04    149<H>  |  115<H>  |  32.4<H>  ----------------------------<  179<H>  3.6   |  19.0<L>  |  1.35<H>    Ca    9.9      04 Oct 2022 04:00  Phos  3.0     10-04  Mg     2.1     10-04    TPro  7.4  /  Alb  3.4  /  TBili  0.8  /  DBili  x   /  AST  19  /  ALT  9   /  AlkPhos  95  10-03      10-02    142  |  106  |  11.7  ----------------------------<  194<H>  3.3<L>   |  24.0  |  0.96    Ca    9.8      02 Oct 2022 07:10    TPro  7.1  /  Alb  3.6  /  TBili  0.3<L>  /  DBili  x   /  AST  18  /  ALT  9   /  AlkPhos  90  10-02                          10.5   15.55 )-----------( 240      ( 06 Oct 2022 04:30 )             33.1                             10.4   18.80 )-----------( 231      ( 05 Oct 2022 03:40 )             32.9                             11.6   22.25 )-----------( 289      ( 04 Oct 2022 04:00 )             35.9                             12.5   13.16 )-----------( 342      ( 02 Oct 2022 07:10 )             38.3       STROKE CORE MEASURES:   HGBA1C- 5.5  LDL- 70  Trig- 68  TSH - 0.2 / 8.4      IMAGING/DATA: [ X] Reviewed      IMPRESSION:  This is an abnormal 21-minute EEG in comatose patient showin. A focal structural abnormality and/or focal coritcal dysfunction over the left hemisphere.   2. 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       US Duplex Venous Lower Ext Complete, Bilateral (10.03.22 @ 16:41) >    IMPRESSION:  No evidence of deep venous thrombosis in either lower extremity.           US Duplex Venous Lower Ext Complete, Bilateral (10.03.22 @ 16:41) >  IMPRESSION:  No evidence of deep venous thrombosis in either lower extremity.         Xray Chest 1 View- PORTABLE-Urgent (Xray Chest 1 View- PORTABLE-Urgent .) (10.03.22 @ 11:23) >    IMPRESSION: Developing bilateral infiltrates. Somewhat low endotracheal   tube. Consider some withdrawal.             CT Brain Stroke Protocol (10.02.22 @ 07:28) >    IMPRESSION:    Large parenchymal hemorrhage centered on the right basal ganglia and   thalamus with intraventricular extension, 1.6 cm of rightward midline   shift, and hydrocephalus.    Prominence of the pituitary gland with superior convexity. Consider   nonemergent follow-up MRI with pituitary protocol to evaluate for   underlying lesion, as clinically warranted, if no contraindications exist.    EKG - Nl sinus rhythmn      EXAMINATION:  PHYSICAL EXAM:    Constitutional: No Acute Distress     Neurological: Intubated , no need for sedation   Pupils 5mm L 5mm R and 3 mm R R , No dolls , No corneal , Pos gag or cough, No grimacing to noxious stimuli , no spont movement    Motor exam:          Upper extremity                         Delt     Bicep     Tricep    HG                                                 R       R decorticate                                                L       L decrebrate           Lower extremity                        HF         KF        KE       DF         PF                                                  R       Flaccid                                               L        Flaccid                                                 Sensation:Not intact     Pulmonary: Clear to Auscultation,    Cardiovascular: S1, S2, Regular rate and rhythm     Gastrointestinal: Soft, Non-tender, Non-distended

## 2022-10-05 NOTE — PROGRESS NOTE ADULT - ASSESSMENT
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    High risk for EVD- sig brain shift and no chance of viable outcome with improvement  S/P 70 grams Mannitol on admission   Maintain Na - 140-- 145 - secondary to shift and matteo hematoma edema  Keppra 500mg q 12  Teagan - On   Palliative Care consultation - step daughter can be involved in discussion   Activity:  [X] Bedrest      PULM: Resp failure secondary to neurologic injury - intubation / Resp alkalosis  Capnogram   CXR  Goal CO2 - 35- 38   Maintain  O2 sats > 92 %  CXR- R Middle lobe infiltrate   ETT - pull back 1 cm      CV: S/P HTN urgency  EKG - Nl sinus rhythm  Cardiazem _ lisinopril + hydralazine  ECho  TTE Echo Complete w/ Contrast w/ Doppler (10.03.22 @ 12:33) >  Summary:   1. Technically difficult study.   2. Left ventricular ejection fraction, by visual estimation, is >75%.   3. Hyperdynamic global left ventricular systolic function.   4. Mildly increased LV wall thickness.   5. Normal right ventricular size and function, inadequate estimation of   RVSP.   6. Trace mitral valve regurgitation.   7. Mild mitral annular calcification.   8. Small-to-moderate size pericardial effusion, largest loculated region   measures 1.4 cm along the RV apex with apical wall systolic   invagaination, fibrinous layering also noted. No obvious   echocardiographic evidence of tamponade.   9. Incidental finding of a large cystic structure in the liver,   measuring 7.30cm x 8.45cm.  Maintain Nl electrolytes  Troponin- < .01  SBP goal- SBp 110- < 160    RENAL:  S/P Hypokalemia , uremia  Fluids- 500cc X1  Check electrolytes ; suppl K   monitor urine output  DI watch- If Urine out > 250 cc/hr for 3 consective hrs- sNa ; urine osmolality \      GI: Large Liver cyst   Hold Chest abd pelvis contrast CT for now   Jevity- 55cc/hr   GI prophylaxis  [X ] other: Pepcid 20mg q12  Bowel regimen  [X] senna     ENDO:   HGBA1C -0 5.5  TSH- 0.18/ Free T4   LDL- nl   Trig - as above   Goal euglycemia (-180)    HEME/ONC: Mild leucocytosis likely stress related   VTE prophylaxis: x[] SCDs [X] chemoprophylaxis- Bleed likely secondary to amyloid hold AC   UA - neg ; CXR - no infiltrate;   Doppler LE - neg    ID: COVID Pos - Asymptomatic  RLL infiltrate- RLL infiltrate   Lactate- 1.20  Blood - P  Sputum    Cefepime 2 gram q12- Day 2/7 ABX    MRSA Nasal PCR - neg   UA - neg  Afebrile      LUE - IV iniltrated     SOCIAL/FAMILY:  [X] updated at bedside- Ron Beck     CODE STATUS:   [X] DNR I [X] Palliative/Comfort Care    DISPOSITION:  [X] ICU    [X] Patient is at high risk of neurologic deterioration/death due to:  brain swelling ; herniation and bleeding    Time spent:50 critical care minutes ASSESSMENT/PLAN: 77 yo female spont L ICH  L ICH M- likely secondary to amyloid vs HTN  Resp failure  Brain compression   HTN Urgency  Hypokalemic   Coma       NEURO:  Coma checks  CT brain - shift stable yet increased R lateral vent Blood in Post horn  Case discussed with Neurosurgery and deemed not surgery candidate based on mRS and GCS  GOC with  Ron - Cell - [phone], Home - [phone] - expressed her lack of independence at home requiring ADL's by him who makes all medical decisions.    High risk for EVD- sig brain shift and no chance of viable outcome with improvement  S/P 70 grams Mannitol on admission   Maintain Na - 140-- 145 - secondary to shift and matteo hematoma edema  Keppra 500mg q 12  Teagan - On   Palliative Care consultation - step daughter can be involved in discussion   Activity:  [X] Bedrest      PULM: Resp failure secondary to neurologic injury - intubation / Resp alkalosis  Capnogram   CXR- improved inspiration and CXR improved infiltrate  Goal CO2 - 35- 38   Maintain  O2 sats > 92 %          CV: S/P HTN urgency  EKG - Nl sinus rhythm  Cardiazem _ lisinopril + hydralazine  ECho  TTE Echo Complete w/ Contrast w/ Doppler (10.03.22 @ 12:33) >  Summary:   1. Technically difficult study.   2. Left ventricular ejection fraction, by visual estimation, is >75%.   3. Hyperdynamic global left ventricular systolic function.   4. Mildly increased LV wall thickness.   5. Normal right ventricular size and function, inadequate estimation of   RVSP.   6. Trace mitral valve regurgitation.   7. Mild mitral annular calcification.   8. Small-to-moderate size pericardial effusion, largest loculated region   measures 1.4 cm along the RV apex with apical wall systolic   invagaination, fibrinous layering also noted. No obvious   echocardiographic evidence of tamponade.   9. Incidental finding of a large cystic structure in the liver,   measuring 7.30cm x 8.45cm.  Maintain Nl electrolytes  Troponin- < .01  SBP goal- SBp 110- < 160    RENAL:  S/P Hypokalemia , uremia  Fluids- 500cc X1  Check electrolytes ; suppl K   monitor urine output  DI watch- If Urine out > 250 cc/hr for 3 consective hrs- sNa ; urine osmolality \      GI: Large Liver cyst   Hold Chest abd pelvis contrast CT for now   Jevity- 55cc/hr   GI prophylaxis  [X ] other: Pepcid 20mg q12  Bowel regimen  [X] senna     ENDO:   HGBA1C -0 5.5  TSH- 0.18/ Free T4   LDL- nl   Trig - as above   Goal euglycemia (-180)    HEME/ONC: Mild leucocytosis likely stress related   VTE prophylaxis: x[] SCDs [X] chemoprophylaxis- Bleed likely secondary to amyloid hold AC and hold with increased R lat vent horn  UA - neg ; CXR - no infiltrate;   Doppler LE - neg    ID: COVID Pos - Asymptomatic  RLL infiltrate- RLL infiltrate   Lactate- 1.20  Blood - P  Sputum    Cefepime 2 gram q12- Day 2/7 ABX    MRSA Nasal PCR - neg   UA - neg  Afebrile      LUE - IV iniltrated     SOCIAL/FAMILY:  [X] updated at bedside- Ron Beck     CODE STATUS:   [X] DNR I [X] Palliative/Comfort Care    DISPOSITION:  [X] ICU    [X] Patient is at high risk of neurologic deterioration/death due to:  brain swelling ; herniation and bleeding    Time spent:50 critical care minutes ASSESSMENT/PLAN: 75 yo female spont L ICH  L ICH M- likely secondary to amyloid vs HTN  Resp failure  Brain compression   HTN Urgency  Hypokalemic   Coma       NEURO:  Coma checks  CT brain - shift stable yet increased R lateral vent Blood in Post horn  Case discussed with Neurosurgery and deemed not surgery candidate based on mRS and GCS  GOC with  Ron - Cell - [phone], Home - [phone] - expressed her lack of independence at home requiring ADL's by him who makes all medical decisions.    High risk for EVD- sig brain shift and no chance of viable outcome with improvement  S/P 70 grams Mannitol on admission   Maintain Na - 140-- 150 - secondary to shift and matteo hematoma edema  Keppra 500mg q 12  Teagan - On   Palliative Care consultation - step daughter can be involved in discussion   Activity:  [X] Bedrest      PULM: Resp failure secondary to neurologic injury - intubation / Resp alkalosis; RLL Pna  Capnogram   CXR- improved inspiration and CXR improved infiltrate; repeat in am   Albuterol inhala form q 6  Fentanyl 25 mcg q 6 prn mod and 50mcg q6 severe  Goal CO2 - 35- 38   Maintain  O2 sats > 92 %      CV: S/P HTN urgency  EKG - Nl sinus rhythm  Cardiazem _ lisinopril + hydralazine  ECho  TTE Echo Complete w/ Contrast w/ Doppler (10.03.22 @ 12:33) >  Summary:   1. Technically difficult study.   2. Left ventricular ejection fraction, by visual estimation, is >75%.   3. Hyperdynamic global left ventricular systolic function.   4. Mildly increased LV wall thickness.   5. Normal right ventricular size and function, inadequate estimation of   RVSP.   6. Trace mitral valve regurgitation.   7. Mild mitral annular calcification.   8. Small-to-moderate size pericardial effusion, largest loculated region   measures 1.4 cm along the RV apex with apical wall systolic   invagaination, fibrinous layering also noted. No obvious   echocardiographic evidence of tamponade.   9. Incidental finding of a large cystic structure in the liver,   measuring 7.30cm x 8.45cm.  Maintain Nl electrolytes  Troponin- < .01  SBP goal- SBp 110- < 160    RENAL:  S/P Hypokalemia , Cr improving  Fluids- 500cc X1  Check electrolytes ; suppl K   D/C anderson  DI watch- If Urine out > 250 cc/hr for 3 consective hrs- sNa ; urine osmolality \    GI: Large Liver cyst   Hold Chest abd pelvis contrast CT for now   Jevity- 55cc/hr   GI prophylaxis  [X ] other: Pepcid 20mg  per NGT q12  Bowel regimen  [X] senna     ENDO: Euthyroid sick  HGBA1C -0 5.5  TSH- 0.18/ Free T4   LDL- nl   Trig - as above   Goal euglycemia (-180)    HEME/ONC :Leucocytosis im,proved  VTE prophylaxis: x[] SCDs [X] chemoprophylaxis- Bleed likely secondary to amyloid hold AC and hold with increased R lat vent horn  UA - neg ; CXR - no infiltrate;   Doppler LE - neg    ID: COVID Pos - Asymptomatic  RLL infiltrate- RLL infiltrate improving  Lactate- 1.20  Blood - No growth to date 10/4/22  Sputum - Nl Resp dion present  Cefepime 2 gram q12- Day 3/7 ABX    MRSA Nasal PCR - neg   UA - neg  Afebrile       SOCIAL/FAMILY:  [X] updated at bedside- Ron Beck     CODE STATUS:   [X] DNR I [X] Palliative/Comfort Care    DISPOSITION:  [X] ICU    [X] Patient is at high risk of neurologic deterioration/death due to:  brain swelling ; herniation and bleeding    Time spent:40 critical care minutes

## 2022-10-05 NOTE — PROGRESS NOTE ADULT - ASSESSMENT
76yr woman hx HTN CHF CVA admitted with right IVH with midline shift, COVID+    IVH with shift  plan per NSICU  not a surgical candidate  poor neurological outlook    Acute Respiratory Failure  wean as tolerated  monitor O2    Leukocytosis  IV abx    Hypertensive Urgency  monitoring BPs    COVID +  asymptomatic  monitor     Encounter for Palliative Care  Spoke with  and close family friend  Deya Adame. Informed them of current poor neurological exam   gives permission to give information to Deya, as well as patient's daughter and sister Stacie

## 2022-10-05 NOTE — PROGRESS NOTE ADULT - SUBJECTIVE AND OBJECTIVE BOX
CC:  Follow up GOC , Symptoms    OVERNIGHT EVENTS:  remains intubated  mental status remains poor    Present Symptoms:   Dyspnea:  No  - on vent  Nausea/Vomiting:  No   Anxiety:  No   Depression:   Unable  Fatigue:  Yes     Loss of appetite:    NA   Constipation: Not Reported       Pain:  No Signs            Character-            Duration-            Location-            Severity-    Pain AD Score:  [email or web address] (press ctrl + left click to view)    Review of Systems: Reviewed as above  Further ROS unable to  obtain due to poor mentation     MEDICATIONS  (STANDING):  cefepime  Injectable.      cefepime  Injectable. 2000 milliGRAM(s) IV Push every 12 hours  chlorhexidine 0.12% Liquid 15 milliLiter(s) Oral Mucosa every 12 hours  chlorhexidine 2% Cloths 1 Application(s) Topical daily  dexMEDEtomidine Infusion 0.2 MICROgram(s)/kG/Hr (3.32 mL/Hr) IV Continuous <Continuous>  diltiazem    Tablet 60 milliGRAM(s) Oral every 8 hours  famotidine Injectable 20 milliGRAM(s) IV Push daily  latanoprost 0.005% Ophthalmic Solution 1 Drop(s) Both EYES at bedtime  levETIRAcetam 500 milliGRAM(s) Oral two times a day  lisinopril 10 milliGRAM(s) Oral daily  polyethylene glycol 3350 17 Gram(s) Oral daily  senna 1 Tablet(s) Oral at bedtime    MEDICATIONS  (PRN):  acetaminophen    Suspension .. 650 milliGRAM(s) Enteral Tube every 6 hours PRN Temp greater or equal to 38C (100.4F)  hydrALAZINE Injectable 10 milliGRAM(s) IV Push every 2 hours PRN SBP >160  labetalol Injectable 10 milliGRAM(s) IV Push every 2 hours PRN SBP >160      PHYSICAL EXAM:    Vital Signs Last 24 Hrs  T(C): 36.6 (05 Oct 2022 12:30), Max: 38 (04 Oct 2022 20:00)  T(F): 97.9 (05 Oct 2022 12:30), Max: 100.4 (04 Oct 2022 20:00)  HR: 62 (05 Oct 2022 12:30) (62 - 91)  BP: 157/95 (05 Oct 2022 12:30) (116/68 - 176/88)  BP(mean): 112 (05 Oct 2022 12:30) (82 - 129)  RR: 17 (05 Oct 2022 12:30) (15 - 32)  SpO2: 100% (05 Oct 2022 12:30) (96% - 100%)    Parameters below as of 05 Oct 2022 12:00  Patient On (Oxygen Delivery Method): ventilator        Karnofsky:  30%  General:  F intubated NAD      HEENT:  NCAT        ( x )  ET tube    Lungs: comfortable  non labored  CV:  RR  GI:  soft NTND         :    anderson           MSK: bedbound  Skin:  warm/dry  Neuro  non responsive    LABS:                          10.4   18.80 )-----------( 231      ( 05 Oct 2022 03:40 )             32.9     10-05    151<H>  |  119<H>  |  28.9<H>  ----------------------------<  144<H>  4.1   |  20.0<L>  |  1.01    Ca    9.5      05 Oct 2022 03:40  Phos  2.3     10-05  Mg     2.5     10-05          I&O's Summary    04 Oct 2022 07:01  -  05 Oct 2022 07:00  --------------------------------------------------------  IN: 2856.7 mL / OUT: 1087 mL / NET: 1769.7 mL    05 Oct 2022 07:01  -  05 Oct 2022 13:33  --------------------------------------------------------  IN: 542 mL / OUT: 325 mL / NET: 217 mL        RADIOLOGY & ADDITIONAL STUDIES:    < from: Xray Chest 1 View- PORTABLE-Urgent (Xray Chest 1 View- PORTABLE-Urgent .) (10.04.22 @ 21:19) >  ACC: [id number] EXAM:  XR CHEST PORTABLE URGENT 1V                          PROCEDURE DATE:  10/04/2022          INTERPRETATION:  ET tube position    AP chest. Prior 10/3/2022.    IMPRESSION:    Endotracheal tube in satisfactory position. Nasogastrictube reidentified   in position. Low lung volumes. No change heart mediastinum. Atelectatic   changes right base. Elevation right hemidiaphragm. No pneumothorax   significant pleural effusion or other interval change    --- End of Report ---      < end of copied text >      ADVANCE DIRECTIVES/TREATMENT PREFERENCES:

## 2022-10-06 NOTE — PROGRESS NOTE ADULT - ASSESSMENT
76yr woman hx HTN CHF CVA admitted with right IVH with midline shift, COVID+    IVH with shift  plan per NSICU  not a surgical candidate  poor neurological outlook    Acute Respiratory Failure  wean as tolerated  monitor O2    Leukocytosis/Xray  IV abx    Hypertensive Urgency  monitoring BPs    COVID +  asymptomatic  monitor     Encounter for Palliative Care  Spoke to  and close friend Deya - tried to prepare them of overall poor neurological outlook   gives permission to speak to Deya, as well as patient's daughter and sister Stacie   continued support.  Rec family meeting. Deya reported to be coming to town tomorrow.

## 2022-10-06 NOTE — PROGRESS NOTE ADULT - ASSESSMENT
ASSESSMENT/PLAN: 77 yo female spont L ICH  L ICH M- likely secondary to amyloid vs HTN  Resp failure  Brain compression   HTN Urgency  Hypokalemic   Coma       NEURO:  Coma checks  CT brain - shift stable yet increased R lateral vent Blood in Post horn  Case discussed with Neurosurgery and deemed not surgery candidate based on mRS and GCS  GOC with  Ron - Cell - [phone], Home - [phone] - expressed her lack of independence at home requiring ADL's by him who makes all medical decisions.    High risk for EVD- sig brain shift and no chance of viable outcome with improvement  S/P 70 grams Mannitol on admission   Maintain Na - 140-- 150 - secondary to shift and matteo hematoma edema  Keppra 500mg q 12  Teagan - On   Palliative Care consultation - step daughter can be involved in discussion   Activity:  [X] Bedrest      PULM: Resp failure secondary to neurologic injury - intubation / Resp alkalosis; RLL Pna  Capnogram   CXR- improved inspiration and CXR improved infiltrate; repeat in am   Albuterol inhala form q 6  Fentanyl 25 mcg q 6 prn mod and 50mcg q6 severe  Goal CO2 - 35- 38   Maintain  O2 sats > 92 %      CV: S/P HTN urgency  EKG - Nl sinus rhythm  Cardiazem _ lisinopril + hydralazine  ECho  TTE Echo Complete w/ Contrast w/ Doppler (10.03.22 @ 12:33) >  Summary:   1. Technically difficult study.   2. Left ventricular ejection fraction, by visual estimation, is >75%.   3. Hyperdynamic global left ventricular systolic function.   4. Mildly increased LV wall thickness.   5. Normal right ventricular size and function, inadequate estimation of   RVSP.   6. Trace mitral valve regurgitation.   7. Mild mitral annular calcification.   8. Small-to-moderate size pericardial effusion, largest loculated region   measures 1.4 cm along the RV apex with apical wall systolic   invagaination, fibrinous layering also noted. No obvious   echocardiographic evidence of tamponade.   9. Incidental finding of a large cystic structure in the liver,   measuring 7.30cm x 8.45cm.  Maintain Nl electrolytes  Troponin- < .01  SBP goal- SBp 110- < 160    RENAL:  S/P Hypokalemia , Cr improving  Fluids- 500cc X1  Check electrolytes ; suppl K   D/C anderson  DI watch- If Urine out > 250 cc/hr for 3 consective hrs- sNa ; urine osmolality \    GI: Large Liver cyst   Hold Chest abd pelvis contrast CT for now   Jevity- 55cc/hr   GI prophylaxis  [X ] other: Pepcid 20mg  per NGT q12  Bowel regimen  [X] senna     ENDO: Euthyroid sick  HGBA1C -0 5.5  TSH- 0.18/ Free T4   LDL- nl   Trig - as above   Goal euglycemia (-180)    HEME/ONC :Leucocytosis im,proved  VTE prophylaxis: x[] SCDs [X] chemoprophylaxis- Bleed likely secondary to amyloid hold AC and hold with increased R lat vent horn  UA - neg ; CXR - no infiltrate;   Doppler LE - neg    ID: COVID Pos - Asymptomatic  RLL infiltrate- RLL infiltrate improving  Lactate- 1.20  Blood - No growth to date 10/4/22  Sputum - Nl Resp dion present  Cefepime 2 gram q12- Day 3/7 ABX    MRSA Nasal PCR - neg   UA - neg  Afebrile       SOCIAL/FAMILY:  [X] updated at bedside- Ron Beck     CODE STATUS:   [X] DNR I [X] Palliative/Comfort Care    DISPOSITION:  [X] ICU    [X] Patient is at high risk of neurologic deterioration/death due to:  brain swelling ; herniation and bleeding    Time spent:40 critical care minutes

## 2022-10-06 NOTE — PHARMACOTHERAPY INTERVENTION NOTE - COMMENTS
Called patient's pharmacy for medication list
Scr improved- recommended to increase famotidine to twice daily and change to PO via OGT
Modified penicillin allergy history to state that patient tolerated cefepime during this admission.    Yohana Rader, PharmD, [facility]  Clinical Pharmacy Specialist, Infectious Diseases  Tele-Antimicrobial Stewardship Program (Tele-ASP)  Tele-ASP Phone: [phone]

## 2022-10-06 NOTE — PROGRESS NOTE ADULT - SUBJECTIVE AND OBJECTIVE BOX
SUMMARY:HPI:  Pt is a 77 yo F brought in by EMS for unresponsiveness.   He called EMS.  Last known well was 11 PM on 10/1/22.  At 515 this morning  heard her yelling and found her on the floor either on the way to or from the bathroom.  Pt unable to provide hx.    ADMISSION SCORES:   ICH score - 5;  Baseline mRS- 4  GCS - 3 T  Allergies    latex (Hives)  Levaquin (Swelling; Rash)  penicillin (Swelling; Rash)      Overnight -= HTN        REVIEW OF SYSTEMS: [x ] Unable to Assess due to neurologic exam    Neuro: [ ] Headache [ ] Back pain [ ] Numbness [ ] Weakness [ ] Ataxia [ ] Dizziness [ ] Aphasia [ ] Dysarthria [ ] Visual disturbance  Resp: [ ] Shortness of breath/dyspnea, [ ] Orthopnea [ ] Cough  CV: [ ] Chest pain [ ] Palpitation [ ] Lightheadedness [ ] Syncope  Renal: [ ] Thirst [ ] Edema  GI: [ ] Nausea [ ] Emesis [ ] Abdominal pain [ ] Constipation [ ] Diarrhea  Hem: [ ] Hematemesis [ ] bright red blood per rectum  ID: [ ] Fever [ ] Chills [ ] Dysuria  ENT: [ ] Rhinorrhea    DEVICES:    [x ] ET tube [ X] anderson       ICU Vital Signs Last 24 Hrs  T(C): 37.3 (06 Oct 2022 08:00), Max: 37.7 (05 Oct 2022 23:00)  T(F): 99.1 (06 Oct 2022 08:00), Max: 99.9 (05 Oct 2022 23:00)  HR: 61 (06 Oct 2022 08:00) (61 - 83)  BP: 127/67 (06 Oct 2022 08:00) (115/61 - 187/93)  BP(mean): 83 (06 Oct 2022 08:00) (77 - 115)  RR: 19 (06 Oct 2022 08:00) (16 - 30)  SpO2: 100% (06 Oct 2022 08:00) (100% - 100%)    O2 Parameters below as of 06 Oct 2022 08:00  Patient On (Oxygen Delivery Method): ventilator    O2 Concentration (%): 30    Mode: AC/ CMV (Assist Control/ Continuous Mandatory Ventilation)  RR (machine): 24  TV (machine): 450  FiO2: 50  PEEP: 5  MAP: 8  PIP: 24      05 Oct 2022 07:01  -  06 Oct 2022 07:00  --------------------------------------------------------  IN:    Dexmedetomidine: 106.9 mL    Enteral Tube Flush: 300 mL    Free Water: 1000 mL    Jevity 1.5: 1320 mL  Total IN: 2726.9 mL    OUT:    Indwelling Catheter - Urethral (mL): 1190 mL  Total OUT: 1190 mL    Total NET: 1536.9 mL      06 Oct 2022 07:01  -  06 Oct 2022 08:54  --------------------------------------------------------  IN:    Dexmedetomidine: 6.6 mL    Jevity 1.5: 110 mL  Total IN: 116.6 mL    OUT:    Indwelling Catheter - Urethral (mL): 30 mL  Total OUT: 30 mL    Total NET: 86.6 mL            04 Oct 2022 07:01  -  05 Oct 2022 07:00  --------------------------------------------------------  IN:    Dexmedetomidine: 18.7 mL    Dexmedetomidine: 68 mL    Enteral Tube Flush: 350 mL    Free Water: 600 mL    Jevity 1.5: 1320 mL    Sodium Chloride 0.9% Bolus: 500 mL  Total IN: 2856.7 mL    OUT:    Indwelling Catheter - Urethral (mL): 1087 mL  Total OUT: 1087 mL    Total NET: 1769.7 mL      05 Oct 2022 07:01  -  05 Oct 2022 11:53  --------------------------------------------------------  IN:    Dexmedetomidine: 13.6 mL    Jevity 1.5: 220 mL  Total IN: 233.6 mL    OUT:    Indwelling Catheter - Urethral (mL): 280 mL  Total OUT: 280 mL    Total NET: -46.4 mL            03 Oct 2022 07:01  -  04 Oct 2022 07:00  --------------------------------------------------------  IN:    Dexmedetomidine: 44.9 mL    IV PiggyBack: 300 mL    IV PiggyBack: 250 mL    Jevity 1.5: 835 mL    NiCARdipine: 537.5 mL    Sodium Chloride 0.9% Bolus: 500 mL  Total IN: 2467.4 mL    OUT:    Indwelling Catheter - Urethral (mL): 745 mL    Nasogastric/Oral tube (mL): 450 mL  Total OUT: 1195 mL    Total NET: 1272.4 mL      04 Oct 2022 07:01  -  04 Oct 2022 11:54  --------------------------------------------------------  IN:    Dexmedetomidine: 18.7 mL    Jevity 1.5: 220 mL  Total IN: 238.7 mL    OUT:    Indwelling Catheter - Urethral (mL): 120 mL  Total OUT: 120 mL    Total NET: 118.7 mL    MEDICATIONS  (STANDING):  cefepime  Injectable.      cefepime  Injectable. 2000 milliGRAM(s) IV Push every 12 hours  chlorhexidine 0.12% Liquid 15 milliLiter(s) Oral Mucosa every 12 hours  chlorhexidine 2% Cloths 1 Application(s) Topical daily  dexMEDEtomidine Infusion 0.2 MICROgram(s)/kG/Hr (3.32 mL/Hr) IV Continuous <Continuous>  diltiazem    Tablet 60 milliGRAM(s) Oral every 8 hours  famotidine Injectable 20 milliGRAM(s) IV Push daily  hydrALAZINE 50 milliGRAM(s) Oral every 8 hours  latanoprost 0.005% Ophthalmic Solution 1 Drop(s) Both EYES at bedtime  levETIRAcetam 500 milliGRAM(s) Oral two times a day  lisinopril 10 milliGRAM(s) Oral daily  polyethylene glycol 3350 17 Gram(s) Oral daily  senna 1 Tablet(s) Oral at bedtime    MEDICATIONS  (PRN):  acetaminophen    Suspension .. 650 milliGRAM(s) Enteral Tube every 6 hours PRN Temp greater or equal to 38C (100.4F)  hydrALAZINE Injectable 10 milliGRAM(s) IV Push every 2 hours PRN SBP >160  labetalol Injectable 10 milliGRAM(s) IV Push every 2 hours PRN SBP >160          LABS:    10-06    150<H>  |  118<H>  |  30.3<H>  ----------------------------<  156<H>  4.5   |  22.0  |  1.01    Ca    9.3      06 Oct 2022 04:30  Phos  2.5     10-06  Mg     2.7     10-06          PT/INR - ( 02 Oct 2022 07:10 )   PT: 12.6 sec;   INR: 1.09 ratio    PTT - ( 02 Oct 2022 07:10 )  PTT:30.9 sec    10-05    151<H>  |  119<H>  |  28.9<H>  ----------------------------<  144<H>  4.1   |  20.0<L>  |  1.01    Ca    9.5      05 Oct 2022 03:40  Phos  2.3     10-05  Mg     2.5     10-05        10-04    149<H>  |  115<H>  |  32.4<H>  ----------------------------<  179<H>  3.6   |  19.0<L>  |  1.35<H>    Ca    9.9      04 Oct 2022 04:00  Phos  3.0     10-04  Mg     2.1     10-04    TPro  7.4  /  Alb  3.4  /  TBili  0.8  /  DBili  x   /  AST  19  /  ALT  9   /  AlkPhos  95  10-03      10-02    142  |  106  |  11.7  ----------------------------<  194<H>  3.3<L>   |  24.0  |  0.96    Ca    9.8      02 Oct 2022 07:10    TPro  7.1  /  Alb  3.6  /  TBili  0.3<L>  /  DBili  x   /  AST  18  /  ALT  9   /  AlkPhos  90  10-02                          10.5   15.55 )-----------( 240      ( 06 Oct 2022 04:30 )             33.1                             10.4   18.80 )-----------( 231      ( 05 Oct 2022 03:40 )             32.9                             11.6   22.25 )-----------( 289      ( 04 Oct 2022 04:00 )             35.9                             12.5   13.16 )-----------( 342      ( 02 Oct 2022 07:10 )             38.3       STROKE CORE MEASURES:   HGBA1C- 5.5  LDL- 70  Trig- 68  TSH - 0.2 / 8.4      IMAGING/DATA: [ X] Reviewed      IMPRESSION:  This is an abnormal 21-minute EEG in comatose patient showin. A focal structural abnormality and/or focal coritcal dysfunction over the left hemisphere.   2. 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       US Duplex Venous Lower Ext Complete, Bilateral (10.03.22 @ 16:41) >    IMPRESSION:  No evidence of deep venous thrombosis in either lower extremity.           US Duplex Venous Lower Ext Complete, Bilateral (10.03.22 @ 16:41) >  IMPRESSION:  No evidence of deep venous thrombosis in either lower extremity.         Xray Chest 1 View- PORTABLE-Urgent (Xray Chest 1 View- PORTABLE-Urgent .) (10.03.22 @ 11:23) >    IMPRESSION: Developing bilateral infiltrates. Somewhat low endotracheal   tube. Consider some withdrawal.             CT Brain Stroke Protocol (10.02.22 @ 07:28) >    IMPRESSION:    Large parenchymal hemorrhage centered on the right basal ganglia and   thalamus with intraventricular extension, 1.6 cm of rightward midline   shift, and hydrocephalus.    Prominence of the pituitary gland with superior convexity. Consider   nonemergent follow-up MRI with pituitary protocol to evaluate for   underlying lesion, as clinically warranted, if no contraindications exist.    EKG - Nl sinus rhythmn      EXAMINATION:  PHYSICAL EXAM:    Constitutional: No Acute Distress     Neurological: Intubated , no need for sedation   Pupils 5mm L 5mm R and 3 mm R R , No dolls , No corneal , Pos gag or cough, No grimacing to noxious stimuli , no spont movement    Motor exam:          Upper extremity                         Delt     Bicep     Tricep    HG                                                 R       R decorticate                                                L       L decrebrate           Lower extremity                        HF         KF        KE       DF         PF                                                  R       Flaccid                                               L        Flaccid                                                 Sensation:Not intact     Pulmonary: Clear to Auscultation,    Cardiovascular: S1, S2, Regular rate and rhythm     Gastrointestinal: Soft, Non-tender, Non-distended

## 2022-10-06 NOTE — PROGRESS NOTE ADULT - SUBJECTIVE AND OBJECTIVE BOX
CC:  Follow up GOC , Symptoms    OVERNIGHT EVENTS:  remains intubated    Present Symptoms:   Dyspnea:  No  on vent  Nausea/Vomiting:  No   Anxiety:  No  Depression:  Unable  Fatigue:  Yes     Loss of appetite:     NA   Constipation: Not Reported     Pain: No Signs            Character-            Duration-            Location-            Severity-    Pain AD Score:  [email or web address] (press ctrl + left click to view)    Review of Systems: Reviewed as above  Further ROS unable to  obtain due to poor mentation       MEDICATIONS  (STANDING):  cefepime  Injectable.      cefepime  Injectable. 2000 milliGRAM(s) IV Push every 12 hours  chlorhexidine 0.12% Liquid 15 milliLiter(s) Oral Mucosa every 12 hours  chlorhexidine 2% Cloths 1 Application(s) Topical daily  dexMEDEtomidine Infusion 0.2 MICROgram(s)/kG/Hr (3.32 mL/Hr) IV Continuous <Continuous>  diltiazem    Tablet 60 milliGRAM(s) Oral every 8 hours  famotidine    Tablet 20 milliGRAM(s) Oral two times a day  hydrALAZINE 50 milliGRAM(s) Oral every 8 hours  latanoprost 0.005% Ophthalmic Solution 1 Drop(s) Both EYES at bedtime  levETIRAcetam 500 milliGRAM(s) Oral two times a day  lisinopril 10 milliGRAM(s) Oral daily  polyethylene glycol 3350 17 Gram(s) Oral daily  senna 1 Tablet(s) Oral at bedtime    MEDICATIONS  (PRN):  acetaminophen    Suspension .. 650 milliGRAM(s) Enteral Tube every 6 hours PRN Temp greater or equal to 38C (100.4F)  ALBUTerol    90 MICROgram(s) HFA Inhaler 2 Puff(s) Inhalation every 6 hours PRN Wheezing  fentaNYL    Injectable 50 MICROGram(s) IV Push every 6 hours PRN Severe Pain (7 - 10)  fentaNYL    Injectable 25 MICROGram(s) IV Push every 6 hours PRN Moderate Pain (4 - 6)  hydrALAZINE Injectable 10 milliGRAM(s) IV Push every 2 hours PRN SBP >160  labetalol Injectable 10 milliGRAM(s) IV Push every 2 hours PRN SBP >160      PHYSICAL EXAM:    Vital Signs Last 24 Hrs  T(C): 37.3 (06 Oct 2022 08:00), Max: 37.7 (05 Oct 2022 23:00)  T(F): 99.1 (06 Oct 2022 08:00), Max: 99.9 (05 Oct 2022 23:00)  HR: 61 (06 Oct 2022 08:53) (61 - 83)  BP: 127/67 (06 Oct 2022 08:00) (115/61 - 187/93)  BP(mean): 83 (06 Oct 2022 08:00) (77 - 115)  RR: 19 (06 Oct 2022 08:00) (16 - 30)  SpO2: 100% (06 Oct 2022 08:53) (100% - 100%)    Parameters below as of 06 Oct 2022 08:00  Patient On (Oxygen Delivery Method): ventilator    O2 Concentration (%): 30    Karnofsky:  20%  General: F intubated non responsive     HEENT:  NCAT   ( x )  ET tube     Lungs: comfortable  non labored  CV:  RR  GI:  normal  :   justin THOMPSON bedbound  Skin:  warm/dry  Neuro  non responsive    LABS:                          10.5   15.55 )-----------( 240      ( 06 Oct 2022 04:30 )             33.1     10-06    150<H>  |  118<H>  |  30.3<H>  ----------------------------<  156<H>  4.5   |  22.0  |  1.01    Ca    9.3      06 Oct 2022 04:30  Phos  2.5     10-06  Mg     2.7     10-06          I&O's Summary    05 Oct 2022 07:01  -  06 Oct 2022 07:00  --------------------------------------------------------  IN: 2726.9 mL / OUT: 1190 mL / NET: 1536.9 mL    06 Oct 2022 07:01  -  06 Oct 2022 09:52  --------------------------------------------------------  IN: 116.6 mL / OUT: 60 mL / NET: 56.6 mL        RADIOLOGY & ADDITIONAL STUDIES:    < from: CT Head No Cont (10.05.22 @ 17:53) >    ACC: [id number] EXAM:  CT BRAIN                          PROCEDURE DATE:  10/05/2022          INTERPRETATION:  INDICATIONS:  L ICH  . Follow-up exam.    TECHNIQUE:  Serial axial images were obtained from the skull base to the   vertex without intravenous contrast. Coronal and sagittal reformatted   images were obtained.    COMPARISON EXAMINATION: 10/2/2022    FINDINGS:  VENTRICLES AND SULCI:  Left sulcal effacement with compression of the   left lateral ventricle and third ventricle, similar in appearance.   Extensive intraventricular hemorrhage is again seen with increased blood   now seen within the right lateral ventricle. Dilatation of the right   lateral ventricle is again seen.  INTRA-AXIAL:  A large left intraparenchymal hematoma is again seen   without significant change in size or appearance measuring approximately   7 x 4.4 cm in axial dimension. There is surrounding vasogenic edema and   1.5 cm rightward midline shift as seen previously.    Patchy areas of white matter hypodensity are seen, likely microvascular   in nature.  EXTRA-AXIAL:  No mass or collection is seen.  VISUALIZED SINUSES:  Mucosal thickening with opacification of the   visualized left maxillary sinus and right maxillary sinus foamy secretions  VISUALIZED MASTOIDS:  Clear.  CALVARIUM:  Normal.  MISCELLANEOUS:  The pituitary gland is hyperdense in appearance, in   retrospect unchanged.    IMPRESSION:  Large left intraparenchymal/basal ganglia region hematoma without   significant change in size with rightward midline shift.    Intraventricular hemorrhage with increased blood within the right lateral   ventricle at this time.    Hyperdense pituitary gland suggesting pituitary hemorrhage.    --- End of Report ---        < end of copied text >      < from: Xray Chest 1 View-PORTABLE IMMEDIATE (10.05.22 @ 15:00) >  ACC: [id number] EXAM:  XR CHEST PORTABLE IMMED 1V                          PROCEDURE DATE:  10/05/2022          INTERPRETATION:  INDICATION: Right lower lobe atelectasis/collapse    COMPARISON: 10/4/2022    FINDINGS:  An AP portable chest x-ray demonstrates an endotracheal tube terminating   approximately 3 cm above the chanel. A nasogastric tube extends below the   diaphragm, extending to the region of the gastric body. There is mild   linear atelectasis in the left lower lobe, unchanged. Although there is a   shallow inspiratory effort, the visualized lungs are otherwise clear. No   infiltrates are seen. There is no pneumothorax. There are no pleural   effusions. The bret appear somewhat prominent but likely accentuated by   the shallow inspiration. Heart size is within normal range, without   pulmonary edema. The bony thorax is grossly intact.    IMPRESSION:  1. Shallow inspiratory effort, with left lower lobe linear atelectasis,   unchanged and no acute cardiopulmonary abnormalities are seen.  2. Bulky appearance of the bret is likely secondary to the shallow   inspiratory effort and is not seen on the prior exam.  3. Nasogastric tube and endotracheal tube in satisfactory location.    --- End of Report ---      < end of copied text >    < from: Xray Chest 1 View- PORTABLE-Urgent (Xray Chest 1 View- PORTABLE-Urgent .) (10.03.22 @ 11:23) >  ACC: [id number] EXAM:  XR CHEST PORTABLE URGENT 1V                          PROCEDURE DATE:  10/03/2022          INTERPRETATION:  AP chest on October 3, 2022 at 10:59 AM. Patient   requires intubation.    Heart magnified by technique. Endotracheal tube again noted. Tip is just   at the chanel. Consider some withdrawal. NG tube remains.    There are developing infiltrates around the right hilum and in the   retrocardiac area compared to October 2.    IMPRESSION: Developing bilateral infiltrates. Somewhat low endotracheal   tube. Consider some withdrawal.    --- End of Report ---      < end of copied text >        ADVANCE DIRECTIVES/TREATMENT PREFERENCES:

## 2022-10-07 NOTE — PROGRESS NOTE ADULT - PROVIDER SPECIALTY LIST ADULT
Neurology
Neurosurgery
NSICU
NSICU
Palliative Care
Palliative Care
NSICU
Palliative Care
NSICU
Neurosurgery
NSICU
NSICU
Palliative Care

## 2022-10-07 NOTE — PROGRESS NOTE ADULT - SUBJECTIVE AND OBJECTIVE BOX
SUMMARY:HPI:  Pt is a 75 yo F brought in by EMS for unresponsiveness.   He called EMS.  Last known well was 11 PM on 10/1/22.  At 515 this morning  heard her yelling and found her on the floor either on the way to or from the bathroom.  Pt unable to provide hx.    ADMISSION SCORES:   ICH score - 5;  Baseline mRS- 4  GCS - 3 T  Allergies    latex (Hives)  Levaquin (Swelling; Rash)  penicillin (Swelling; Rash)      Overnight -= HTN        REVIEW OF SYSTEMS: [x ] Unable to Assess due to neurologic exam    Neuro: [ ] Headache [ ] Back pain [ ] Numbness [ ] Weakness [ ] Ataxia [ ] Dizziness [ ] Aphasia [ ] Dysarthria [ ] Visual disturbance  Resp: [ ] Shortness of breath/dyspnea, [ ] Orthopnea [ ] Cough  CV: [ ] Chest pain [ ] Palpitation [ ] Lightheadedness [ ] Syncope  Renal: [ ] Thirst [ ] Edema  GI: [ ] Nausea [ ] Emesis [ ] Abdominal pain [ ] Constipation [ ] Diarrhea  Hem: [ ] Hematemesis [ ] bright red blood per rectum  ID: [ ] Fever [ ] Chills [ ] Dysuria  ENT: [ ] Rhinorrhea      ICU Vital Signs Last 24 Hrs  T(C): 37 (07 Oct 2022 08:42), Max: 37.2 (07 Oct 2022 00:00)  T(F): 98.6 (07 Oct 2022 08:42), Max: 99 (07 Oct 2022 00:00)  HR: 62 (07 Oct 2022 09:42) (56 - 100)  BP: 185/70 (07 Oct 2022 09:00) (106/64 - 219/90)  BP(mean): 104 (07 Oct 2022 09:00) (75 - 128)  RR: 20 (07 Oct 2022 09:00) (15 - 22)  SpO2: 100% (07 Oct 2022 09:42) (69% - 100%)    O2 Parameters below as of 07 Oct 2022 08:00  Patient On (Oxygen Delivery Method): ventilator    O2 Concentration (%): 30    Mode: AC/ CMV (Assist Control/ Continuous Mandatory Ventilation)  RR (machine): 24  TV (machine): 450  FiO2: 50  PEEP: 5  MAP: 8  PIP: 24      06 Oct 2022 07:01  -  07 Oct 2022 07:00  --------------------------------------------------------  IN:    Dexmedetomidine: 119.8 mL    Enteral Tube Flush: 60 mL    Free Water: 750 mL    Jevity 1.5: 1320 mL  Total IN: 2249.8 mL    OUT:    Indwelling Catheter - Urethral (mL): 110 mL    Voided (mL): 550 mL  Total OUT: 660 mL    Total NET: 1589.8 mL      07 Oct 2022 07:01  -  07 Oct 2022 10:14  --------------------------------------------------------  IN:    Dexmedetomidine: 6.6 mL    Jevity 1.5: 110 mL  Total IN: 116.6 mL    OUT:    Voided (mL): 140 mL  Total OUT: 140 mL    Total NET: -23.4 mL        MEDICATIONS  (STANDING):  cefepime  Injectable.      cefepime  Injectable. 2000 milliGRAM(s) IV Push every 12 hours  chlorhexidine 0.12% Liquid 15 milliLiter(s) Oral Mucosa every 12 hours  chlorhexidine 2% Cloths 1 Application(s) Topical daily  dexMEDEtomidine Infusion 0.2 MICROgram(s)/kG/Hr (3.32 mL/Hr) IV Continuous <Continuous>  diltiazem    Tablet 60 milliGRAM(s) Oral every 8 hours  famotidine    Tablet 20 milliGRAM(s) Oral two times a day  hydrALAZINE 50 milliGRAM(s) Oral every 8 hours  latanoprost 0.005% Ophthalmic Solution 1 Drop(s) Both EYES at bedtime  levETIRAcetam 500 milliGRAM(s) Oral two times a day  lisinopril 10 milliGRAM(s) Oral daily  polyethylene glycol 3350 17 Gram(s) Oral daily  senna 1 Tablet(s) Oral at bedtime    MEDICATIONS  (PRN):  acetaminophen    Suspension .. 650 milliGRAM(s) Enteral Tube every 6 hours PRN Temp greater or equal to 38C (100.4F)  ALBUTerol    90 MICROgram(s) HFA Inhaler 2 Puff(s) Inhalation every 6 hours PRN Wheezing  fentaNYL    Injectable 50 MICROGram(s) IV Push every 6 hours PRN Severe Pain (7 - 10)  fentaNYL    Injectable 25 MICROGram(s) IV Push every 6 hours PRN Moderate Pain (4 - 6)  hydrALAZINE Injectable 10 milliGRAM(s) IV Push every 2 hours PRN SBP >160  labetalol Injectable 10 milliGRAM(s) IV Push every 2 hours PRN SBP >160          LABS:    10-07    149<H>  |  117<H>  |  28.9<H>  ----------------------------<  144<H>  4.6   |  22.0  |  0.87    Ca    9.4      07 Oct 2022 03:40  Phos  2.9     10-07  Mg     2.7     10-07    TPro  5.9<L>  /  Alb  2.2<L>  /  TBili  0.3<L>  /  DBili  0.1  /  AST  73<H>  /  ALT  72<H>  /  AlkPhos  65  10-07    PT/INR - ( 02 Oct 2022 07:10 )   PT: 12.6 sec;   INR: 1.09 ratio    PTT - ( 02 Oct 2022 07:10 )  PTT:30.9 sec    10-05    151<H>  |  119<H>  |  28.9<H>  ----------------------------<  144<H>  4.1   |  20.0<L>  |  1.01    Ca    9.5      05 Oct 2022 03:40  Phos  2.3     10-05  Mg     2.5     10-05          10-02    142  |  106  |  11.7  ----------------------------<  194<H>  3.3<L>   |  24.0  |  0.96    Ca    9.8      02 Oct 2022 07:10    TPro  7.1  /  Alb  3.6  /  TBili  0.3<L>  /  DBili  x   /  AST  18  /  ALT  9   /  AlkPhos  90  10-02                          9.7    11.80 )-----------( 215      ( 07 Oct 2022 03:40 )             30.7                             11.6   22.25 )-----------( 289      ( 04 Oct 2022 04:00 )             35.9                             12.5   13.16 )-----------( 342      ( 02 Oct 2022 07:10 )             38.3       STROKE CORE MEASURES:   HGBA1C- 5.5  LDL- 70  Trig- 68  TSH - 0.2 / 8.4            IMAGING/DATA: [ X] Reviewed      IMPRESSION:  This is an abnormal 21-minute EEG in comatose patient showin. A focal structural abnormality and/or focal coritcal dysfunction over the left hemisphere.   2. 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       US Duplex Venous Lower Ext Complete, Bilateral (10.03.22 @ 16:41) >    IMPRESSION:  No evidence of deep venous thrombosis in either lower extremity.           US Duplex Venous Lower Ext Complete, Bilateral (10.03.22 @ 16:41) >  IMPRESSION:  No evidence of deep venous thrombosis in either lower extremity.         Xray Chest 1 View- PORTABLE-Urgent (Xray Chest 1 View- PORTABLE-Urgent .) (10.03.22 @ 11:23) >    IMPRESSION: Developing bilateral infiltrates. Somewhat low endotracheal   tube. Consider some withdrawal.             CT Brain Stroke Protocol (10.02.22 @ 07:28) >    IMPRESSION:    Large parenchymal hemorrhage centered on the right basal ganglia and   thalamus with intraventricular extension, 1.6 cm of rightward midline   shift, and hydrocephalus.    Prominence of the pituitary gland with superior convexity. Consider   nonemergent follow-up MRI with pituitary protocol to evaluate for   underlying lesion, as clinically warranted, if no contraindications exist.    EKG - Nl sinus rhythmn      EXAMINATION:  PHYSICAL EXAM:    Constitutional: No Acute Distress     Neurological: Intubated , no need for sedation   Pupils  L 2mm R and 4 mm R  , No dolls , PO corneal , Pos gag or cough, No grimacing to noxious stimuli , no spont movement    Motor exam:          Upper extremity                         Delt     Bicep     Tricep    HG                                                 R       R decerebrate                                               L       L decoticate           Lower extremity                        HF         KF        KE       DF         PF                                                  R       TF                                               L        TF                                                 Sensation: no grimacing to noxious     Pulmonary: Clear to Auscultation,    Cardiovascular: S1, S2, Regular rate and rhythm     Gastrointestinal: Soft, Non-tender, Non-distended

## 2022-10-07 NOTE — PROGRESS NOTE ADULT - ASSESSMENT
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    High risk for EVD- sig brain shift and no chance of viable outcome with improvement  Maintain Na - 140-- 150 - secondary to shift and matteo hematoma edema  Keppra 500mg q 12  Teagan - On   Palliative Care consultation - step daughter can be involved in discussion - meeting at 1 pm today [facility]   Activity:  [X] Bedrest      PULM: Resp failure secondary to neurologic injury - intubation / Resp alkalosis; RLL Pna  CXR- improved inspiration and CXR improved infiltrate; repeat in am   Albuterol inhala form q 6  Fentanyl 25 mcg q 6 prn mod and 50mcg q6 severe  Goal CO2 - 35- 38   Maintain  O2 sats > 92 %      CV: S/P HTN urgency  EKG - Nl sinus rhythm  Cardiazem 60mg 6 ;  lisinopril 20mg q day + [id number] q 8  ECho  TTE Echo Complete w/ Contrast w/ Doppler (10.03.22 @ 12:33) >  Summary:   1. Technically difficult study.   2. Left ventricular ejection fraction, by visual estimation, is >75%.   3. Hyperdynamic global left ventricular systolic function.   4. Mildly increased LV wall thickness.   5. Normal right ventricular size and function, inadequate estimation of   RVSP.   6. Trace mitral valve regurgitation.   7. Mild mitral annular calcification.   8. Small-to-moderate size pericardial effusion, largest loculated region   measures 1.4 cm along the RV apex with apical wall systolic   invagaination, fibrinous layering also noted. No obvious   echocardiographic evidence of tamponade.   9. Incidental finding of a large cystic structure in the liver,   measuring 7.30cm x 8.45cm.  Maintain Nl electrolytes  Troponin- < .01  SBP goal- SBp 110- < 160    RENAL:  S/P Hypokalemia , Cr improving  Lasix 20mg IV X1  Check electrolytes ; suppl K   D/C anderson    GI: Large Liver cyst   Hold Chest abd pelvis contrast CT for now   Jevity- 55cc/hr   GI prophylaxis  [X ] other: Pepcid 20mg  per NGT q12  Bowel regimen  [X] senna     ENDO: Euthyroid sick  HGBA1C -0 5.5  TSH- 0.18/ Free T4   LDL- nl   Trig - as above   Goal euglycemia (-180)    HEME/ONC :Leucocytosis im,proved  No CBC  VTE prophylaxis: x[] SCDs [X] chemoprophylaxis- Bleed likely secondary to amyloid hold AC and hold with increased R lat vent horn  UA - neg ; CXR - no infiltrate;   Doppler LE - neg    ID: COVID Pos - Asymptomatic  RLL infiltrate- RLL infiltrate improving  Lactate- 1.20  Blood - No growth to date 10/4/22  Sputum - Nl Resp dion present  Cefepime 2 gram q12- Day 4/7 ABX    MRSA Nasal PCR - neg   UA - neg  Afebrile       SOCIAL/FAMILY:  [X] updated at bedside- Ron Beck     CODE STATUS:   [X] DNR I [X] Palliative/Comfort Care    DISPOSITION:  [X] ICU    [X] Patient is at high risk of neurologic deterioration/death due to:  brain swelling ; herniation and bleeding    Time spent:40 critical care minutes

## 2022-10-07 NOTE — PROGRESS NOTE ADULT - CONVERSATION DETAILS
requested I speak to his close friend with he thinks of as a daughter Deya Adame.  They are aware that she has a brain hemorrhage and on a ventilator. Informed them of patient's  condition and poor neuro status.    Deya confirmed DNR status.  Assured her team is continuing medical treatments.  Tried to prepare them of overall poor outlook.   Psychosocial support.
 is the HCP  Extensive discussion with family regarding patient's condition and overall poor neurological prognosis.  Scans reviewed with and  Family informed patient will not make functional recovery.  Will need trach/peg and nsg home.  If not in line with patient's wishes for her QOL, option for compassionate extubation. Explained use of medication for symptom management for comfort, and allowing for nature to take it's course.       knows wife would not want a feeding tube or invasive interventions.  She did not like going to the doctor.  Daughter Halley ( on phone) needs time to think about it.    Family will discuss amongst themselves and have a decision in the next 2- 3 days.

## 2022-10-07 NOTE — PROGRESS NOTE ADULT - SUBJECTIVE AND OBJECTIVE BOX
CC:  Follow up GOC , Symptoms    OVERNIGHT EVENTS:  remains on vent    Present Symptoms:   Dyspnea:  No  on vent  Nausea/Vomiting:  No    Anxiety:  No  Depression:   Unable  Fatigue:  Yes     Loss of appetite:     NA   Constipation: Not Reported      Pain:  No Signs            Character-            Duration-            Location-            Severity-    Pain AD Score:  [email or web address] (press ctrl + left click to view)    Review of Systems: Reviewed as above  Further ROS unable to  obtain due to poor mentation     MEDICATIONS  (STANDING):  cefepime  Injectable.      cefepime  Injectable. 2000 milliGRAM(s) IV Push every 12 hours  chlorhexidine 0.12% Liquid 15 milliLiter(s) Oral Mucosa every 12 hours  chlorhexidine 2% Cloths 1 Application(s) Topical daily  dexMEDEtomidine Infusion 0.2 MICROgram(s)/kG/Hr (3.32 mL/Hr) IV Continuous <Continuous>  diltiazem    Tablet 60 milliGRAM(s) Oral every 8 hours  famotidine    Tablet 20 milliGRAM(s) Oral two times a day  furosemide   Injectable 20 milliGRAM(s) IV Push once  hydrALAZINE 100 milliGRAM(s) Oral every 8 hours  latanoprost 0.005% Ophthalmic Solution 1 Drop(s) Both EYES at bedtime  levETIRAcetam 500 milliGRAM(s) Oral two times a day  lisinopril 20 milliGRAM(s) Oral daily  lisinopril 10 milliGRAM(s) Oral daily  polyethylene glycol 3350 17 Gram(s) Oral daily  senna 1 Tablet(s) Oral at bedtime    MEDICATIONS  (PRN):  acetaminophen    Suspension .. 650 milliGRAM(s) Enteral Tube every 6 hours PRN Temp greater or equal to 38C (100.4F)  ALBUTerol    90 MICROgram(s) HFA Inhaler 2 Puff(s) Inhalation every 6 hours PRN Wheezing  fentaNYL    Injectable 50 MICROGram(s) IV Push every 6 hours PRN Severe Pain (7 - 10)  fentaNYL    Injectable 25 MICROGram(s) IV Push every 6 hours PRN Moderate Pain (4 - 6)  hydrALAZINE Injectable 10 milliGRAM(s) IV Push every 2 hours PRN SBP >160  labetalol Injectable 10 milliGRAM(s) IV Push every 2 hours PRN SBP >160      PHYSICAL EXAM:    Vital Signs Last 24 Hrs  T(C): 37 (07 Oct 2022 08:42), Max: 37.2 (07 Oct 2022 00:00)  T(F): 98.6 (07 Oct 2022 08:42), Max: 99 (07 Oct 2022 00:00)  HR: 272 (07 Oct 2022 11:40) (56 - 272)  BP: 185/70 (07 Oct 2022 09:00) (106/64 - 219/90)  BP(mean): 104 (07 Oct 2022 09:00) (75 - 128)  RR: 20 (07 Oct 2022 09:00) (15 - 22)  SpO2: 100% (07 Oct 2022 11:40) (69% - 100%)    Parameters below as of 07 Oct 2022 08:00  Patient On (Oxygen Delivery Method): ventilator    O2 Concentration (%): 30    Karnofsky:  10  General: F intubated NAD  HEENT:  NCAT     ( x)  ET tube    Lungs: comfortable  non labored  CV:  RR  GI:   soft NTND  :  incontinent  MSK: bedbound  Skin:  warm/dry  Neuro  non responsive     LABS:                          9.7    11.80 )-----------( 215      ( 07 Oct 2022 03:40 )             30.7     10-07    149<H>  |  117<H>  |  28.9<H>  ----------------------------<  144<H>  4.6   |  22.0  |  0.87    Ca    9.4      07 Oct 2022 03:40  Phos  2.9     10-07  Mg     2.7     10-07    TPro  5.9<L>  /  Alb  2.2<L>  /  TBili  0.3<L>  /  DBili  0.1  /  AST  73<H>  /  ALT  72<H>  /  AlkPhos  65  10-07        I&O's Summary    06 Oct 2022 07:01  -  07 Oct 2022 07:00  --------------------------------------------------------  IN: 2249.8 mL / OUT: 660 mL / NET: 1589.8 mL    07 Oct 2022 07:01  -  07 Oct 2022 11:51  --------------------------------------------------------  IN: 116.6 mL / OUT: 140 mL / NET: -23.4 mL        RADIOLOGY & ADDITIONAL STUDIES:    < from: CT Head No Cont (10.05.22 @ 17:53) >    ACC: [id number] EXAM:  CT BRAIN                          PROCEDURE DATE:  10/05/2022          INTERPRETATION:  INDICATIONS:  L ICH  . Follow-up exam.    TECHNIQUE:  Serial axial images were obtained from the skull base to the   vertex without intravenous contrast. Coronal and sagittal reformatted   images were obtained.    COMPARISON EXAMINATION: 10/2/2022    FINDINGS:  VENTRICLES AND SULCI:  Left sulcal effacement with compression of the   left lateral ventricle and third ventricle, similar in appearance.   Extensive intraventricular hemorrhage is again seen with increased blood   now seen within the right lateral ventricle. Dilatation of the right   lateral ventricle is again seen.  INTRA-AXIAL:  A large left intraparenchymal hematoma is again seen   without significant change in size or appearance measuring approximately   7 x 4.4 cm in axial dimension. There is surrounding vasogenic edema and   1.5 cm rightward midline shift as seen previously.    Patchy areas of white matter hypodensity are seen, likely microvascular   in nature.  EXTRA-AXIAL:  No mass or collection is seen.  VISUALIZED SINUSES:  Mucosal thickening with opacification of the   visualized left maxillary sinus and right maxillary sinus foamy secretions  VISUALIZED MASTOIDS:  Clear.  CALVARIUM:  Normal.  MISCELLANEOUS:  The pituitary gland is hyperdense in appearance, in   retrospect unchanged.    IMPRESSION:  Large left intraparenchymal/basal ganglia region hematoma without   significant change in size with rightward midline shift.    Intraventricular hemorrhage with increased blood within the right lateral   ventricle at this time.    Hyperdense pituitary gland suggesting pituitary hemorrhage.    --- End of Report ---            MARY NORRIS MD; AttendingRadiologist  This document has been electronically signed. Oct  6 2022  8:02AM    < end of copied text >      ADVANCE DIRECTIVES/TREATMENT PREFERENCES:

## 2022-10-07 NOTE — PROGRESS NOTE ADULT - TIME BILLING
Review of chart documents, labs, imaging. Direct patient assessment,  formulation of care plan. Discussion with  Interdisciplinary  team  NSICU Dr. Ponce
Review of chart documents, labs, imaging. Direct patient assessment,  formulation of care plan. Discussion with  Interdisciplinary  team  NSICU Dr. Ponce,nurse including ACP  _16____minutes
Review of chart documents, labs, imaging. Direct patient assessment,  formulation of care plan. Discussion with  Interdisciplinary  team  DANYA Rainey , nurse   including ACP  _50____minutes
Review of chart documents, labs, imaging. Direct patient assessment,  formulation of care plan. Discussion with  Interdisciplinary  team  Dr. Perez,   Nurse Calvin

## 2022-10-07 NOTE — PROGRESS NOTE ADULT - ASSESSMENT
76yr woman hx HTN CHF CVA admitted with right IVH with midline shift, COVID+    IVH with shift  plan per NSICU  not a surgical candidate  poor neurological outlook    Acute Respiratory Failure  remains on vent  monitor O2    Leukocytosis/PNA  IV abx    Hypertensive Urgency  monitoring BPs    COVID +  asymptomatic  monitor     Encounter for Palliative Care   76yr woman hx HTN CHF CVA admitted with right IVH with midline shift, COVID+    IVH with shift  plan per NSICU  not a surgical candidate  poor neurological outlook    Acute Respiratory Failure  remains on vent  monitor O2    Leukocytosis/PNA  IV abx    Hypertensive Urgency  monitoring BPs    COVID +  asymptomatic  monitor     Encounter for Palliative Care  Family meeting today - see [facility] note for details.  In summary:  Family informed of overall poor prognosis for functional recovery.   inclined for compassionate extubation.  They have a daughter who lives in [place], and needs time to think about it.   They will have a decision 2-3 days.  They were encouraged to facetime with her daughter  since she is out of state and if traveling may be  difficult.  Psychosocial support.

## 2022-10-07 NOTE — PROGRESS NOTE ADULT - REASON FOR ADMISSION
found unresp

## 2022-10-07 NOTE — PROGRESS NOTE ADULT - CRITICAL CARE ATTENDING COMMENT
[X]  77 yo female spont L ICH  L ICH M- likely secondary to amyloid vs HTN  Resp failure  Brain compression   HTN Urgency  Hypokalemic     Patient is at high risk of neurologic deterioration/death due to:  brain swelling ; herniation and bleeding
[X]  75 yo female spont L ICH  L ICH M- likely secondary to amyloid vs HTN  Resp failure  Brain compression   HTN Urgency  Hypokalemic     Patient is at high risk of neurologic deterioration/death due to:  brain swelling ; herniation and bleeding
[X]  77 yo female spont L ICH  L ICH M- likely secondary to amyloid vs HTN  Resp failure  Brain compression   HTN Urgency  Hypokalemic     Patient is at high risk of neurologic deterioration/death due to:  brain swelling ; herniation and bleeding

## 2022-10-08 NOTE — DISCHARGE NOTE FOR THE EXPIRED PATIENT - HOSPITAL COURSE
Patient is a 76 year old female found unresponsive on the bathroom floor by spouse, presented to ED with poor exam, intubated, and  found to have large L ICH with brain compression and severely hypertensive with SBP > 200. Family opted for no neurosurgical intervention and proceeded to make the patient DNR. Over the next several days, pt received conservative medical management without improvement in exam. After several family discussions, family leaning towards comfort measures only. However, pt with bradycardia and hypotension, family notified of new instability and likelihood of death occurring before withdrawing care. Agreed to no escalation of care including pressors and to continue DNR status. Patient pronounced death based on cardiopulmonary death criteria on 10/8/2022 at 1am.

## 2022-10-09 LAB
CULTURE RESULTS: SIGNIFICANT CHANGE UP
CULTURE RESULTS: SIGNIFICANT CHANGE UP
SPECIMEN SOURCE: SIGNIFICANT CHANGE UP
SPECIMEN SOURCE: SIGNIFICANT CHANGE UP

## 2022-10-17 NOTE — CHART NOTE - NSCHARTNOTEFT_GEN_A_CORE
Palliative care social work note.    Bereavement call made to patients spouse Ron. Support and resources offered.

## 2022-10-20 PROCEDURE — C8929: CPT

## 2022-10-20 PROCEDURE — 80048 BASIC METABOLIC PNL TOTAL CA: CPT

## 2022-10-20 PROCEDURE — 94799 UNLISTED PULMONARY SVC/PX: CPT

## 2022-10-20 PROCEDURE — 93005 ELECTROCARDIOGRAM TRACING: CPT

## 2022-10-20 PROCEDURE — 86900 BLOOD TYPING SEROLOGIC ABO: CPT

## 2022-10-20 PROCEDURE — 84295 ASSAY OF SERUM SODIUM: CPT

## 2022-10-20 PROCEDURE — 86850 RBC ANTIBODY SCREEN: CPT

## 2022-10-20 PROCEDURE — 36600 WITHDRAWAL OF ARTERIAL BLOOD: CPT

## 2022-10-20 PROCEDURE — 85730 THROMBOPLASTIN TIME PARTIAL: CPT

## 2022-10-20 PROCEDURE — 81001 URINALYSIS AUTO W/SCOPE: CPT

## 2022-10-20 PROCEDURE — 82803 BLOOD GASES ANY COMBINATION: CPT

## 2022-10-20 PROCEDURE — 84132 ASSAY OF SERUM POTASSIUM: CPT

## 2022-10-20 PROCEDURE — 84443 ASSAY THYROID STIM HORMONE: CPT

## 2022-10-20 PROCEDURE — 86905 BLOOD TYPING RBC ANTIGENS: CPT

## 2022-10-20 PROCEDURE — 84100 ASSAY OF PHOSPHORUS: CPT

## 2022-10-20 PROCEDURE — 82962 GLUCOSE BLOOD TEST: CPT

## 2022-10-20 PROCEDURE — 83605 ASSAY OF LACTIC ACID: CPT

## 2022-10-20 PROCEDURE — 93970 EXTREMITY STUDY: CPT

## 2022-10-20 PROCEDURE — 84300 ASSAY OF URINE SODIUM: CPT

## 2022-10-20 PROCEDURE — 85014 HEMATOCRIT: CPT

## 2022-10-20 PROCEDURE — 99291 CRITICAL CARE FIRST HOUR: CPT | Mod: 25

## 2022-10-20 PROCEDURE — 84480 ASSAY TRIIODOTHYRONINE (T3): CPT

## 2022-10-20 PROCEDURE — 85610 PROTHROMBIN TIME: CPT

## 2022-10-20 PROCEDURE — 95819 EEG AWAKE AND ASLEEP: CPT

## 2022-10-20 PROCEDURE — 82947 ASSAY GLUCOSE BLOOD QUANT: CPT

## 2022-10-20 PROCEDURE — 87040 BLOOD CULTURE FOR BACTERIA: CPT

## 2022-10-20 PROCEDURE — 82330 ASSAY OF CALCIUM: CPT

## 2022-10-20 PROCEDURE — 83935 ASSAY OF URINE OSMOLALITY: CPT

## 2022-10-20 PROCEDURE — 80053 COMPREHEN METABOLIC PANEL: CPT

## 2022-10-20 PROCEDURE — 86803 HEPATITIS C AB TEST: CPT

## 2022-10-20 PROCEDURE — 87070 CULTURE OTHR SPECIMN AEROBIC: CPT

## 2022-10-20 PROCEDURE — 80076 HEPATIC FUNCTION PANEL: CPT

## 2022-10-20 PROCEDURE — 86901 BLOOD TYPING SEROLOGIC RH(D): CPT

## 2022-10-20 PROCEDURE — 95711 VEEG 2-12 HR UNMONITORED: CPT

## 2022-10-20 PROCEDURE — 86870 RBC ANTIBODY IDENTIFICATION: CPT

## 2022-10-20 PROCEDURE — 84484 ASSAY OF TROPONIN QUANT: CPT

## 2022-10-20 PROCEDURE — 85018 HEMOGLOBIN: CPT

## 2022-10-20 PROCEDURE — 83036 HEMOGLOBIN GLYCOSYLATED A1C: CPT

## 2022-10-20 PROCEDURE — 86880 COOMBS TEST DIRECT: CPT

## 2022-10-20 PROCEDURE — 87086 URINE CULTURE/COLONY COUNT: CPT

## 2022-10-20 PROCEDURE — 94760 N-INVAS EAR/PLS OXIMETRY 1: CPT

## 2022-10-20 PROCEDURE — 87637 SARSCOV2&INF A&B&RSV AMP PRB: CPT

## 2022-10-20 PROCEDURE — 85025 COMPLETE CBC W/AUTO DIFF WBC: CPT

## 2022-10-20 PROCEDURE — 80061 LIPID PANEL: CPT

## 2022-10-20 PROCEDURE — 87640 STAPH A DNA AMP PROBE: CPT

## 2022-10-20 PROCEDURE — 96374 THER/PROPH/DIAG INJ IV PUSH: CPT

## 2022-10-20 PROCEDURE — 70450 CT HEAD/BRAIN W/O DYE: CPT

## 2022-10-20 PROCEDURE — 96376 TX/PRO/DX INJ SAME DRUG ADON: CPT

## 2022-10-20 PROCEDURE — 36415 COLL VENOUS BLD VENIPUNCTURE: CPT

## 2022-10-20 PROCEDURE — 94003 VENT MGMT INPAT SUBQ DAY: CPT

## 2022-10-20 PROCEDURE — 84436 ASSAY OF TOTAL THYROXINE: CPT

## 2022-10-20 PROCEDURE — 96375 TX/PRO/DX INJ NEW DRUG ADDON: CPT

## 2022-10-20 PROCEDURE — 82435 ASSAY OF BLOOD CHLORIDE: CPT

## 2022-10-20 PROCEDURE — 85520 HEPARIN ASSAY: CPT

## 2022-10-20 PROCEDURE — 83735 ASSAY OF MAGNESIUM: CPT

## 2022-10-20 PROCEDURE — 71045 X-RAY EXAM CHEST 1 VIEW: CPT

## 2022-10-20 PROCEDURE — 87641 MR-STAPH DNA AMP PROBE: CPT

## 2022-10-20 PROCEDURE — 85027 COMPLETE CBC AUTOMATED: CPT
